# Patient Record
Sex: FEMALE | Race: WHITE | Employment: OTHER | URBAN - METROPOLITAN AREA
[De-identification: names, ages, dates, MRNs, and addresses within clinical notes are randomized per-mention and may not be internally consistent; named-entity substitution may affect disease eponyms.]

---

## 2019-08-27 ENCOUNTER — HOSPITAL ENCOUNTER (EMERGENCY)
Age: 39
Discharge: HOME | End: 2019-08-27
Attending: EMERGENCY MEDICINE
Payer: MEDICAID

## 2019-08-27 VITALS
HEIGHT: 66 IN | WEIGHT: 167.11 LBS | RESPIRATION RATE: 18 BRPM | TEMPERATURE: 98.9 F | BODY MASS INDEX: 26.86 KG/M2 | OXYGEN SATURATION: 97 % | DIASTOLIC BLOOD PRESSURE: 52 MMHG | SYSTOLIC BLOOD PRESSURE: 148 MMHG | HEART RATE: 72 BPM

## 2019-08-27 DIAGNOSIS — B86 SCABIES: Primary | ICD-10-CM

## 2019-08-27 PROCEDURE — 99282 EMERGENCY DEPT VISIT SF MDM: CPT

## 2019-08-27 RX ORDER — PERMETHRIN 50 MG/G
CREAM TOPICAL
Qty: 1 TUBE | Refills: 0 | Status: SHIPPED | OUTPATIENT
Start: 2019-08-27 | End: 2022-04-19

## 2019-08-27 RX ORDER — ALPRAZOLAM 1 MG/1
1 TABLET ORAL 3 TIMES DAILY
COMMUNITY
End: 2022-04-19

## 2019-08-27 RX ORDER — DEXTROAMPHETAMINE SACCHARATE, AMPHETAMINE ASPARTATE, DEXTROAMPHETAMINE SULFATE AND AMPHETAMINE SULFATE 5; 5; 5; 5 MG/1; MG/1; MG/1; MG/1
30 TABLET ORAL DAILY
COMMUNITY
End: 2022-04-19

## 2019-08-27 ASSESSMENT — PAIN DESCRIPTION - DESCRIPTORS: DESCRIPTORS: BURNING;ACHING

## 2019-08-27 ASSESSMENT — PAIN DESCRIPTION - LOCATION: LOCATION: ARM;LEG

## 2019-08-27 ASSESSMENT — PAIN DESCRIPTION - PROGRESSION: CLINICAL_PROGRESSION: GRADUALLY WORSENING

## 2019-08-27 ASSESSMENT — PAIN DESCRIPTION - PAIN TYPE: TYPE: ACUTE PAIN

## 2019-08-27 ASSESSMENT — PAIN DESCRIPTION - ONSET: ONSET: PROGRESSIVE

## 2019-08-27 ASSESSMENT — PAIN SCALES - GENERAL: PAINLEVEL_OUTOF10: 10

## 2019-08-27 ASSESSMENT — PAIN DESCRIPTION - ORIENTATION: ORIENTATION: RIGHT;LEFT

## 2019-08-27 NOTE — ED PROVIDER NOTES
Social History Narrative    Not on file       SCREENINGS           PHYSICAL EXAM    (up to 7 for level 4, 8 or more for level 5)     ED Triage Vitals [08/27/19 1159]   BP Temp Temp Source Pulse Resp SpO2 Height Weight   (!) 148/52 98.9 °F (37.2 °C) Oral 72 18 97 % 5' 6\" (1.676 m) 167 lb 1.7 oz (75.8 kg)       Physical Exam    General: Alert and awake ×3. Nontoxic appearance. Well-developed well-nourished chronically ill looking 22-year-old with a rash otherwise in no distress  HEENT: Normocephalic atraumatic. Neck is supple. Airway intact. No adenopathy  Cardiac: Regular rate and rhythm with no murmurs rubs or gallops  Pulmonary: Lungs are clear in all lung fields. No wheezing. No Rales. Abdomen: Soft and nontender. Negative hepatosplenomegaly. Bowel sounds are active  Extremities: Moving all extremities. No calf tenderness. Peripheral pulses all intact  Skin: Notable discrete lesions consistent with bites. There is no pearls noted. Areas of excoriation and bleeding from her picking at it. Do not see any evidence of lice at this time. Neurologic: Cranial nerves II through XII was grossly intact. Nonfocal neurological exam  Psychiatric: Patient is pleasant. Mood is appropriate. DIAGNOSTIC RESULTS     EKG (Per Emergency Physician):       RADIOLOGY (Per Emergency Physician): Interpretation per the Radiologist below, if available at the time of this note:  No results found. ED BEDSIDE ULTRASOUND:   Performed by ED Physician - none    LABS:  Labs Reviewed - No data to display     All other labs were within normal range or not returned as of this dictation. Procedures      EMERGENCY DEPARTMENT COURSE and DIFFERENTIAL DIAGNOSIS/MDM:   Vitals:    Vitals:    08/27/19 1159   BP: (!) 148/52   Pulse: 72   Resp: 18   Temp: 98.9 °F (37.2 °C)   TempSrc: Oral   SpO2: 97%   Weight: 167 lb 1.7 oz (75.8 kg)   Height: 5' 6\" (1.676 m)       Medications - No data to display    MDM.     This is a

## 2022-04-19 ENCOUNTER — HOSPITAL ENCOUNTER (EMERGENCY)
Age: 42
Discharge: ANOTHER HOSPITAL | End: 2022-04-19
Attending: EMERGENCY MEDICINE
Payer: MEDICAID

## 2022-04-19 ENCOUNTER — HOSPITAL ENCOUNTER (INPATIENT)
Age: 42
LOS: 6 days | Discharge: HOME | DRG: 885 | End: 2022-04-25
Attending: PSYCHIATRY & NEUROLOGY | Admitting: PSYCHIATRY & NEUROLOGY
Payer: MEDICAID

## 2022-04-19 VITALS
DIASTOLIC BLOOD PRESSURE: 69 MMHG | HEART RATE: 98 BPM | HEIGHT: 67 IN | SYSTOLIC BLOOD PRESSURE: 101 MMHG | OXYGEN SATURATION: 98 % | TEMPERATURE: 98.8 F | BODY MASS INDEX: 28.93 KG/M2 | WEIGHT: 184.3 LBS | RESPIRATION RATE: 17 BRPM

## 2022-04-19 DIAGNOSIS — Z59.00 HOMELESSNESS: ICD-10-CM

## 2022-04-19 DIAGNOSIS — R45.851 DEPRESSION WITH SUICIDAL IDEATION: Primary | ICD-10-CM

## 2022-04-19 DIAGNOSIS — F29 PSYCHOSIS, UNSPECIFIED PSYCHOSIS TYPE (HCC): Primary | ICD-10-CM

## 2022-04-19 DIAGNOSIS — F32.A DEPRESSION WITH SUICIDAL IDEATION: Primary | ICD-10-CM

## 2022-04-19 PROBLEM — F23 ACUTE PSYCHOSIS (HCC): Status: ACTIVE | Noted: 2022-04-19

## 2022-04-19 LAB
A/G RATIO: 1.4 (ref 1.1–2.2)
ACETAMINOPHEN LEVEL: <5 UG/ML (ref 10–30)
ALBUMIN SERPL-MCNC: 4.5 G/DL (ref 3.4–5)
ALP BLD-CCNC: 99 U/L (ref 40–129)
ALT SERPL-CCNC: 15 U/L (ref 10–40)
AMPHETAMINE SCREEN, URINE: ABNORMAL
ANION GAP SERPL CALCULATED.3IONS-SCNC: 18 MMOL/L (ref 3–16)
AST SERPL-CCNC: 14 U/L (ref 15–37)
BARBITURATE SCREEN URINE: ABNORMAL
BASOPHILS ABSOLUTE: 0 K/UL (ref 0–0.2)
BASOPHILS RELATIVE PERCENT: 0.3 %
BENZODIAZEPINE SCREEN, URINE: ABNORMAL
BILIRUB SERPL-MCNC: 0.4 MG/DL (ref 0–1)
BILIRUBIN URINE: NEGATIVE
BLOOD, URINE: NEGATIVE
BUN BLDV-MCNC: 12 MG/DL (ref 7–20)
CALCIUM SERPL-MCNC: 9.5 MG/DL (ref 8.3–10.6)
CANNABINOID SCREEN URINE: POSITIVE
CHLORIDE BLD-SCNC: 107 MMOL/L (ref 99–110)
CLARITY: CLEAR
CO2: 19 MMOL/L (ref 21–32)
COCAINE METABOLITE SCREEN URINE: POSITIVE
COLOR: YELLOW
CREAT SERPL-MCNC: 0.5 MG/DL (ref 0.6–1.1)
EKG ATRIAL RATE: 66 BPM
EKG DIAGNOSIS: NORMAL
EKG P AXIS: 43 DEGREES
EKG P-R INTERVAL: 132 MS
EKG Q-T INTERVAL: 422 MS
EKG QRS DURATION: 76 MS
EKG QTC CALCULATION (BAZETT): 442 MS
EKG R AXIS: 23 DEGREES
EKG T AXIS: 20 DEGREES
EKG VENTRICULAR RATE: 66 BPM
EOSINOPHILS ABSOLUTE: 0.1 K/UL (ref 0–0.6)
EOSINOPHILS RELATIVE PERCENT: 1.6 %
ETHANOL: NORMAL MG/DL (ref 0–0.08)
GFR AFRICAN AMERICAN: >60
GFR NON-AFRICAN AMERICAN: >60
GLUCOSE BLD-MCNC: 100 MG/DL (ref 70–99)
GLUCOSE URINE: NEGATIVE MG/DL
HCG(URINE) PREGNANCY TEST: NEGATIVE
HCT VFR BLD CALC: 44 % (ref 36–48)
HEMOGLOBIN: 14.5 G/DL (ref 12–16)
KETONES, URINE: NEGATIVE MG/DL
LEUKOCYTE ESTERASE, URINE: NEGATIVE
LYMPHOCYTES ABSOLUTE: 1.4 K/UL (ref 1–5.1)
LYMPHOCYTES RELATIVE PERCENT: 19.8 %
Lab: ABNORMAL
MCH RBC QN AUTO: 29.5 PG (ref 26–34)
MCHC RBC AUTO-ENTMCNC: 33 G/DL (ref 31–36)
MCV RBC AUTO: 89.2 FL (ref 80–100)
METHADONE SCREEN, URINE: ABNORMAL
MICROSCOPIC EXAMINATION: NORMAL
MONOCYTES ABSOLUTE: 0.4 K/UL (ref 0–1.3)
MONOCYTES RELATIVE PERCENT: 5.6 %
NEUTROPHILS ABSOLUTE: 5.1 K/UL (ref 1.7–7.7)
NEUTROPHILS RELATIVE PERCENT: 72.7 %
NITRITE, URINE: NEGATIVE
OPIATE SCREEN URINE: ABNORMAL
OXYCODONE URINE: ABNORMAL
PDW BLD-RTO: 13.8 % (ref 12.4–15.4)
PH UA: 6
PH UA: 6 (ref 5–8)
PHENCYCLIDINE SCREEN URINE: ABNORMAL
PLATELET # BLD: 250 K/UL (ref 135–450)
PMV BLD AUTO: 8.1 FL (ref 5–10.5)
POTASSIUM REFLEX MAGNESIUM: 3.9 MMOL/L (ref 3.5–5.1)
PROPOXYPHENE SCREEN: ABNORMAL
PROTEIN UA: NEGATIVE MG/DL
RBC # BLD: 4.93 M/UL (ref 4–5.2)
SALICYLATE, SERUM: <0.3 MG/DL (ref 15–30)
SARS-COV-2, NAAT: NOT DETECTED
SODIUM BLD-SCNC: 144 MMOL/L (ref 136–145)
SPECIFIC GRAVITY UA: 1.01 (ref 1–1.03)
TOTAL PROTEIN: 7.8 G/DL (ref 6.4–8.2)
URINE REFLEX TO CULTURE: NORMAL
URINE TYPE: NORMAL
UROBILINOGEN, URINE: 0.2 E.U./DL
WBC # BLD: 7.1 K/UL (ref 4–11)

## 2022-04-19 PROCEDURE — 93010 ELECTROCARDIOGRAM REPORT: CPT | Performed by: INTERNAL MEDICINE

## 2022-04-19 PROCEDURE — 80179 DRUG ASSAY SALICYLATE: CPT

## 2022-04-19 PROCEDURE — 80053 COMPREHEN METABOLIC PANEL: CPT

## 2022-04-19 PROCEDURE — 87635 SARS-COV-2 COVID-19 AMP PRB: CPT

## 2022-04-19 PROCEDURE — 84703 CHORIONIC GONADOTROPIN ASSAY: CPT

## 2022-04-19 PROCEDURE — 82077 ASSAY SPEC XCP UR&BREATH IA: CPT

## 2022-04-19 PROCEDURE — 85025 COMPLETE CBC W/AUTO DIFF WBC: CPT

## 2022-04-19 PROCEDURE — 80307 DRUG TEST PRSMV CHEM ANLYZR: CPT

## 2022-04-19 PROCEDURE — 36415 COLL VENOUS BLD VENIPUNCTURE: CPT

## 2022-04-19 PROCEDURE — 80143 DRUG ASSAY ACETAMINOPHEN: CPT

## 2022-04-19 PROCEDURE — 93005 ELECTROCARDIOGRAM TRACING: CPT | Performed by: EMERGENCY MEDICINE

## 2022-04-19 PROCEDURE — 99285 EMERGENCY DEPT VISIT HI MDM: CPT

## 2022-04-19 PROCEDURE — 81003 URINALYSIS AUTO W/O SCOPE: CPT

## 2022-04-19 PROCEDURE — 6370000000 HC RX 637 (ALT 250 FOR IP): Performed by: EMERGENCY MEDICINE

## 2022-04-19 PROCEDURE — 6370000000 HC RX 637 (ALT 250 FOR IP): Performed by: PSYCHIATRY & NEUROLOGY

## 2022-04-19 PROCEDURE — 1240000000 HC EMOTIONAL WELLNESS R&B

## 2022-04-19 RX ORDER — LORAZEPAM 2 MG/1
2 TABLET ORAL EVERY 6 HOURS PRN
Status: DISCONTINUED | OUTPATIENT
Start: 2022-04-19 | End: 2022-04-25 | Stop reason: HOSPADM

## 2022-04-19 RX ORDER — NAPROXEN 500 MG/1
500 TABLET ORAL 2 TIMES DAILY PRN
Status: ON HOLD | COMMUNITY
End: 2022-04-25 | Stop reason: HOSPADM

## 2022-04-19 RX ORDER — IBUPROFEN 600 MG/1
600 TABLET ORAL EVERY 6 HOURS PRN
Status: DISCONTINUED | OUTPATIENT
Start: 2022-04-19 | End: 2022-04-25 | Stop reason: HOSPADM

## 2022-04-19 RX ORDER — ALBUTEROL SULFATE 90 UG/1
2 AEROSOL, METERED RESPIRATORY (INHALATION) EVERY 4 HOURS PRN
COMMUNITY

## 2022-04-19 RX ORDER — 0.9 % SODIUM CHLORIDE 0.9 %
1000 INTRAVENOUS SOLUTION INTRAVENOUS ONCE
Status: DISCONTINUED | OUTPATIENT
Start: 2022-04-19 | End: 2022-04-19 | Stop reason: HOSPADM

## 2022-04-19 RX ORDER — BENZTROPINE MESYLATE 0.5 MG/1
0.5 TABLET ORAL 2 TIMES DAILY
Status: ON HOLD | COMMUNITY
End: 2022-04-25 | Stop reason: HOSPADM

## 2022-04-19 RX ORDER — HYDROXYZINE PAMOATE 25 MG/1
25 CAPSULE ORAL EVERY 6 HOURS PRN
Status: ON HOLD | COMMUNITY
End: 2022-04-25 | Stop reason: HOSPADM

## 2022-04-19 RX ORDER — LORAZEPAM 1 MG/1
1 TABLET ORAL ONCE
Status: COMPLETED | OUTPATIENT
Start: 2022-04-19 | End: 2022-04-19

## 2022-04-19 RX ORDER — DEXTROAMPHETAMINE SACCHARATE, AMPHETAMINE ASPARTATE, DEXTROAMPHETAMINE SULFATE AND AMPHETAMINE SULFATE 5; 5; 5; 5 MG/1; MG/1; MG/1; MG/1
20 TABLET ORAL DAILY
Status: ON HOLD | COMMUNITY
End: 2022-04-25 | Stop reason: HOSPADM

## 2022-04-19 RX ADMIN — LORAZEPAM 1 MG: 1 TABLET ORAL at 13:02

## 2022-04-19 RX ADMIN — IBUPROFEN 600 MG: 600 TABLET ORAL at 21:21

## 2022-04-19 RX ADMIN — LORAZEPAM 2 MG: 2 TABLET ORAL at 21:21

## 2022-04-19 SDOH — ECONOMIC STABILITY: FOOD INSECURITY: WITHIN THE PAST 12 MONTHS, THE FOOD YOU BOUGHT JUST DIDN'T LAST AND YOU DIDN'T HAVE MONEY TO GET MORE.: SOMETIMES TRUE

## 2022-04-19 SDOH — ECONOMIC STABILITY: HOUSING INSECURITY
IN THE LAST 12 MONTHS, WAS THERE A TIME WHEN YOU DID NOT HAVE A STEADY PLACE TO SLEEP OR SLEPT IN A SHELTER (INCLUDING NOW)?: YES

## 2022-04-19 SDOH — ECONOMIC STABILITY: HOUSING INSECURITY

## 2022-04-19 SDOH — SOCIAL STABILITY: SOCIAL INSECURITY
WITHIN THE LAST YEAR, HAVE YOU BEEN HUMILIATED OR EMOTIONALLY ABUSED IN OTHER WAYS BY YOUR PARTNER OR EX-PARTNER?: PATIENT DECLINED

## 2022-04-19 SDOH — ECONOMIC STABILITY - HOUSING INSECURITY: HOMELESSNESS UNSPECIFIED: Z59.00

## 2022-04-19 SDOH — ECONOMIC STABILITY: INCOME INSECURITY: IN THE LAST 12 MONTHS, WAS THERE A TIME WHEN YOU WERE NOT ABLE TO PAY THE MORTGAGE OR RENT ON TIME?: YES

## 2022-04-19 SDOH — SOCIAL STABILITY: SOCIAL INSECURITY: WITHIN THE LAST YEAR, HAVE YOU BEEN AFRAID OF YOUR PARTNER OR EX-PARTNER?: NO

## 2022-04-19 SDOH — SOCIAL STABILITY: SOCIAL NETWORK: HOW OFTEN DO YOU ATTEND CHURCH OR RELIGIOUS SERVICES?: NEVER

## 2022-04-19 SDOH — ECONOMIC STABILITY: INCOME INSECURITY: HOW HARD IS IT FOR YOU TO PAY FOR THE VERY BASICS LIKE FOOD, HOUSING, MEDICAL CARE, AND HEATING?: NOT HARD AT ALL

## 2022-04-19 SDOH — HEALTH STABILITY: PHYSICAL HEALTH: ON AVERAGE, HOW MANY DAYS PER WEEK DO YOU ENGAGE IN MODERATE TO STRENUOUS EXERCISE (LIKE A BRISK WALK)?: 7 DAYS

## 2022-04-19 SDOH — SOCIAL STABILITY: SOCIAL NETWORK

## 2022-04-19 SDOH — HEALTH STABILITY: PHYSICAL HEALTH: ON AVERAGE, HOW MANY MINUTES DO YOU ENGAGE IN EXERCISE AT THIS LEVEL?: 50 MIN

## 2022-04-19 SDOH — HEALTH STABILITY: MENTAL HEALTH: HOW MANY STANDARD DRINKS CONTAINING ALCOHOL DO YOU HAVE ON A TYPICAL DAY?: PATIENT DECLINED

## 2022-04-19 SDOH — ECONOMIC STABILITY: FOOD INSECURITY: WITHIN THE PAST 12 MONTHS, YOU WORRIED THAT YOUR FOOD WOULD RUN OUT BEFORE YOU GOT MONEY TO BUY MORE.: SOMETIMES TRUE

## 2022-04-19 SDOH — SOCIAL STABILITY: SOCIAL NETWORK
DO YOU BELONG TO ANY CLUBS OR ORGANIZATIONS SUCH AS CHURCH GROUPS UNIONS, FRATERNAL OR ATHLETIC GROUPS, OR SCHOOL GROUPS?: NO

## 2022-04-19 SDOH — ECONOMIC STABILITY: TRANSPORTATION INSECURITY
IN THE PAST 12 MONTHS, HAS LACK OF TRANSPORTATION KEPT YOU FROM MEETINGS, WORK, OR FROM GETTING THINGS NEEDED FOR DAILY LIVING?: YES

## 2022-04-19 SDOH — ECONOMIC STABILITY: TRANSPORTATION INSECURITY
IN THE PAST 12 MONTHS, HAS THE LACK OF TRANSPORTATION KEPT YOU FROM MEDICAL APPOINTMENTS OR FROM GETTING MEDICATIONS?: YES

## 2022-04-19 SDOH — SOCIAL STABILITY: SOCIAL NETWORK: IN A TYPICAL WEEK, HOW MANY TIMES DO YOU TALK ON THE PHONE WITH FAMILY, FRIENDS, OR NEIGHBORS?: PATIENT DECLINED

## 2022-04-19 SDOH — SOCIAL STABILITY: SOCIAL INSECURITY
WITHIN THE LAST YEAR, HAVE YOU BEEN KICKED, HIT, SLAPPED, OR OTHERWISE PHYSICALLY HURT BY YOUR PARTNER OR EX-PARTNER?: PATIENT DECLINED

## 2022-04-19 SDOH — SOCIAL STABILITY: SOCIAL INSECURITY
WITHIN THE LAST YEAR, HAVE TO BEEN RAPED OR FORCED TO HAVE ANY KIND OF SEXUAL ACTIVITY BY YOUR PARTNER OR EX-PARTNER?: PATIENT DECLINED

## 2022-04-19 SDOH — HEALTH STABILITY: MENTAL HEALTH: HOW OFTEN DO YOU HAVE A DRINK CONTAINING ALCOHOL?: PATIENT DECLINED

## 2022-04-19 SDOH — HEALTH STABILITY: MENTAL HEALTH
STRESS IS WHEN SOMEONE FEELS TENSE, NERVOUS, ANXIOUS, OR CAN'T SLEEP AT NIGHT BECAUSE THEIR MIND IS TROUBLED. HOW STRESSED ARE YOU?: VERY MUCH

## 2022-04-19 SDOH — SOCIAL STABILITY: SOCIAL NETWORK: HOW OFTEN DO YOU ATTENT MEETINGS OF THE CLUB OR ORGANIZATION YOU BELONG TO?: NEVER

## 2022-04-19 SDOH — SOCIAL STABILITY: SOCIAL NETWORK: HOW OFTEN DO YOU GET TOGETHER WITH FRIENDS OR RELATIVES?: PATIENT DECLINED

## 2022-04-19 ASSESSMENT — PAIN SCALES - GENERAL
PAINLEVEL_OUTOF10: 8
PAINLEVEL_OUTOF10: 7

## 2022-04-19 ASSESSMENT — SLEEP AND FATIGUE QUESTIONNAIRES
DIFFICULTY STAYING ASLEEP: YES
RESTFUL SLEEP: YES
DIFFICULTY FALLING ASLEEP: YES
SLEEP PATTERN: DIFFICULTY FALLING ASLEEP;EARLY AWAKENING;RESTLESSNESS;INSOMNIA;DISTURBED/INTERRUPTED SLEEP
DIFFICULTY ARISING: NO
DO YOU USE A SLEEP AID: NO
AVERAGE NUMBER OF SLEEP HOURS: 6
DO YOU HAVE DIFFICULTY SLEEPING: YES

## 2022-04-19 ASSESSMENT — LIFESTYLE VARIABLES: HISTORY_ALCOHOL_USE: YES

## 2022-04-19 ASSESSMENT — PAIN DESCRIPTION - PAIN TYPE: TYPE: ACUTE PAIN

## 2022-04-19 ASSESSMENT — PAIN DESCRIPTION - LOCATION: LOCATION: MOUTH;NOSE

## 2022-04-19 ASSESSMENT — PATIENT HEALTH QUESTIONNAIRE - PHQ9: SUM OF ALL RESPONSES TO PHQ QUESTIONS 1-9: 9

## 2022-04-19 NOTE — BH NOTE
Patient arrived on unit at approximately 1910 accompanied by [facility]. She was calm and cooperative on initials assessment. Vitals were taken, ADL items provided, food and drink was requested and provided. Pt then stated she was going to take a shower and change. Will continue to monitor.

## 2022-04-19 NOTE — PROGRESS NOTES
Medication Reconciliation    List of medications patient is currently taking is IN PROGRESS    Unable to assess patient's home medication history at this time due to agitation / homicidal ideation    Updated medication history based on [facility] Records / INOCENTE [facility], PharmD, BCPS  4/19/2022 11:41 AM

## 2022-04-19 NOTE — ED NOTES
Patient refused to allow me to insert PIV.       Goznalo Diaz RN  04/19/22 9812 Recommend smaller more frequent meals and avoid late-night eating ( within 2-3 hours of bed)  Consider elevating the head of your bed at night with blocks or a mattress wedge  Continue to minimize fatty/fried foods, chocolate, caffeine, alcohol, NSAIDs ( ibuprofen /Motrin / Advil, Aleve /naproxen, full-dose aspirin)   try to decrease your smoking as you are able   gentle weight loss as you are able   continue to take the pantoprazole in the morning 20-30 minutes before you eat or drink  You will be scheduled for upper scope here at our endoscopy center    Gastroesophageal Reflux Disease   WHAT YOU NEED TO KNOW:   What is gastroesophageal reflux disease? Gastroesophageal reflux occurs when acid and food in the stomach back up into the esophagus  Gastroesophageal reflux disease (GERD) is reflux that occurs more than twice a week for a few weeks  It usually causes heartburn and other symptoms  GERD can cause other health problems over time if it is not treated  What causes GERD? GERD often occurs when the lower muscle (sphincter) of the esophagus does not close properly  The sphincter normally opens to let food into the stomach  It then closes to keep food and stomach acid in the stomach  If the sphincter does not close properly, stomach acid and food back up (reflux) into the esophagus  The following may increase your risk for GERD:  · Certain foods such as spicy foods, chocolate, foods that contain caffeine, peppermint, and fried foods    · Hiatal hernia    · Certain medicines such as calcium channel blockers (used to treat high blood pressure), allergy medicines, sedatives, or antidepressants    · Pregnancy or obesity    · Lying down after a meal    · Drinking alcohol or smoking  What are the signs and symptoms of GERD? Heartburn is the most common symptom of GERD  You may feel burning pain in your chest or below the breast bone  This usually occurs after meals and spreads to your neck, jaw, or shoulder  The pain gets better when you change positions  You may also have any of the following:  · Bitter or acid taste in your mouth    · Dry cough    · Trouble swallowing or pain with swallowing    · Hoarseness or sore throat    · Frequent burping or hiccups    · Feeling of fullness soon after you start eating  How is GERD diagnosed? Your healthcare provider will ask about your symptoms and when they started  Tell him or her about other medical conditions you have, your eating habits, and your activities  You may also need any of the following:  · Esophageal pH monitoring  is used to place a small probe inside your esophagus and stomach to check the amount of acid  · An endoscopy  is a procedure used to look at the inside of your esophagus and stomach  An endoscope is a bendable tube with a light and camera on the end  Your healthcare provider may remove a small sample of tissue and send it to a lab for tests  · Upper GI x-rays  are done to take pictures of your stomach and intestines (bowel)  You may be given a chalky liquid to drink before the pictures are taken  This liquid helps your stomach and intestines show up better on the x-rays  · Esophageal manometry  is a test that shows how your esophagus pushes food and fluid to your stomach  It also shows the pressures in your esophagus and stomach  It may show a hiatal hernia  How is GERD treated? · Medicines  are used to decrease stomach acid  Medicine may also be used to help your lower esophageal sphincter and stomach contract (tighten) more  · Surgery  is done to wrap the upper part of the stomach around the esophageal sphincter  This will strengthen the sphincter and prevent reflux  How can I help manage GERD? · Do not have foods or drinks that may increase heartburn  These include chocolate, peppermint, fried or fatty foods, drinks that contain caffeine, or carbonated drinks (soda)   Other foods include spicy foods, onions, tomatoes, and tomato-based foods  Do not have foods or drinks that can irritate your esophagus, such as citrus fruits, juices, and alcohol  · Do not eat large meals  When you eat a lot of food at one time, your stomach needs more acid to digest it  Eat 6 small meals each day instead of 3 large ones, and eat slowly  Do not eat meals 2 to 3 hours before bedtime  · Elevate the head of your bed  Place 6-inch blocks under the head of your bed frame  You may also use more than one pillow under your head and shoulders while you sleep  · Maintain a healthy weight  If you are overweight, weight loss may help relieve symptoms of GERD  · Do not smoke  Smoking weakens the lower esophageal sphincter and increases the risk of GERD  Ask your healthcare provider for information if you currently smoke and need help to quit  E-cigarettes or smokeless tobacco still contain nicotine  Talk to your healthcare provider before you use these products  · Do not wear clothing that is tight around your waist   Tight clothing can put pressure on your stomach and cause or worsen GERD symptoms  When should I seek immediate care? · You feel full and cannot burp or vomit  · You have severe chest pain and sudden trouble breathing  · Your bowel movements are black, bloody, or tarry-looking  · Your vomit looks like coffee grounds or has blood in it  When should I contact my healthcare provider? · You vomit large amounts, or you vomit often  · You have trouble breathing after you vomit  · You have trouble swallowing, or pain with swallowing  · You are losing weight without trying  · Your symptoms get worse or do not improve with treatment  · You have questions or concerns about your condition or care  CARE AGREEMENT:   You have the right to help plan your care  Learn about your health condition and how it may be treated  Discuss treatment options with your caregivers to decide what care you want to receive   You always have the right to refuse treatment  The above information is an  only  It is not intended as medical advice for individual conditions or treatments  Talk to your doctor, nurse or pharmacist before following any medical regimen to see if it is safe and effective for you  © 2017 [employer] Information is for End User's use only and may not be sold, redistributed or otherwise used for commercial purposes  All illustrations and images included in CareNotes® are the copyrighted property of A D A M , Inc  or [employer] Rene

## 2022-04-19 NOTE — ED PROVIDER NOTES
[address] [facility]      Pt Name: Andrew Giraldo  MRN: [id number]  [place] 1980  Date of evaluation: 4/19/2022  Provider: Arthur Humphrey DO    CHIEF COMPLAINT       Chief Complaint   Patient presents with    Suicidal     states that she is tired of their shit she doesnt want to live any more she has been off her medications x 2 weeks     Homicidal     Patient reports suicidal ideations and states her plan is to \"blow up the world\", patient's speech is irratic, flight of ideas and irrational thought process noted, patient rambling off various dates and referencing the \"end of the world\". Patient also expresses thoughts of harming others and references bombs and \"end of days\"         HISTORY OF PRESENT ILLNESS   (Location/Symptom, Timing/Onset, Context/Setting, Quality, Duration, Modifying Factors, Severity)  Note limiting factors. Andrew Giraldo is a 39 y.o. female who presents to the emergency department with a complaint of suicidal and homicidal ideations. The patient states that she took a bus to the hospital today because she felt like she needed help. She is currently homeless. She states that her family and other families are \"conspiring against her\". She states that people want to beat her up and make her into a prostitute. She denies any physical injury or harm. She denies any sexual assault. She states that she occasionally hears voices but does not hear voices today. The voices do not direct her to do anything. She denies any visual hallucinations. She states \"I am tired of everyone and I am tired of living\". She reports a history of prior suicide attempts with taking a fentanyl overdose. She reports suicidal thoughts and states that if she were to kill her self it would likely be because of taking fentanyl. She denies any current drug use. She denies any alcohol use.   She denies ingestion of any medications chemicals or substances recently or today. She smokes tobacco.  She occasionally smokes marijuana. She also reports that she has had homicidal ideations. She states \"I just want to blow everyone up and burn it down\". She denies any specific plan to harm anyone. Denies any recent illness, fever chills cough cold symptoms. She denies any headache chest pain shortness of breath. She denies any abdominal pain nausea vomiting or diarrhea. She is not pregnant. She denies any back or flank pain. No focal weakness or numbness. Nursing Notes were reviewed. HPI        REVIEW OF SYSTEMS    (2-9 systems for level 4, 10 or more for level 5)       Constitutional: Negative for fever or chills. HENT: Negative for rhinorrhea and sore throat. Eyes: Negative for redness or drainage. Respiratory: Negative for shortness of breath or dyspnea on exertion. Cardiovascular: Negative for chest pain. Gastrointestinal: Negative for abdominal pain. Negative for vomiting or diarrhea. Genitourinary: Negative for flank pain. Negative for dysuria. Negative for hematuria. Neurological: Negative for headache. Musculoskeletal:  Negative edema. Hematological: Negative for adenopathy. All systems are reviewed and are negative except for those listed above in the history of present illness and ROS. PAST MEDICAL HISTORY     Past Medical History:   Diagnosis Date    Anxiety     Cancer ([facility] [address]     breast    Depression     Heart murmur     Hypertension          SURGICAL HISTORY       Past Surgical History:   Procedure Laterality Date    BREAST SURGERY           CURRENT MEDICATIONS       Previous Medications    ALBUTEROL SULFATE HFA (VENTOLIN HFA) 108 (90 BASE) MCG/ACT INHALER    Inhale 2 puffs into the lungs every 4 hours as needed for Wheezing or Shortness of Breath    AMPHETAMINE-DEXTROAMPHETAMINE (ADDERALL) 20 MG TABLET    Take 20 mg by mouth daily.     BENZTROPINE (COGENTIN) 0.5 MG TABLET    Take 0.5 mg by mouth 2 times daily    HYDROXYZINE (VISTARIL) 25 MG CAPSULE    Take 25 mg by mouth every 6 hours as needed for Itching    NAPROXEN (NAPROSYN) 500 MG TABLET    Take 500 mg by mouth 2 times daily as needed for Pain       ALLERGIES     Codeine, Tramadol, Vicodin [hydrocodone-acetaminophen], and Geodon [ziprasidone hydrochloride]    FAMILY HISTORY     History reviewed. No pertinent family history. SOCIAL HISTORY       Social History     Socioeconomic History    Marital status: Single     Spouse name: None    Number of children: None    Years of education: None    Highest education level: None   Occupational History    None   Tobacco Use    Smoking status: Current Every Day Smoker     Packs/day: 1.00     Types: Cigarettes    Smokeless tobacco: None   Substance and Sexual Activity    Alcohol use: No     Comment: occassionally    Drug use: Yes     Types: Marijuana Augusto Pare)    Sexual activity: Yes   Other Topics Concern    None   Social History Narrative    None     Social Determinants of Health     Financial Resource Strain:     Difficulty of Paying Living Expenses: Not on file   Food Insecurity:     Worried About Running Out of Food in the Last Year: Not on file    Shan of Food in the Last Year: Not on file   Transportation Needs:     Lack of Transportation (Medical): Not on file    Lack of Transportation (Non-Medical):  Not on file   Physical Activity:     Days of Exercise per Week: Not on file    Minutes of Exercise per Session: Not on file   Stress:     Feeling of Stress : Not on file   Social Connections:     Frequency of Communication with Friends and Family: Not on file    Frequency of Social Gatherings with Friends and Family: Not on file    Attends Congregational Services: Not on file    Active Member of Clubs or Organizations: Not on file    Attends Club or Organization Meetings: Not on file    Marital Status: Not on file   Intimate Partner Violence:     Fear of Current or Ex-Partner: Not on file    Emotionally Abused: Not on file    Physically Abused: Not on file    Sexually Abused: Not on file   Housing Stability:     Unable to Pay for Housing in the Last Year: Not on file    Number of [place] in the Last Year: Not on file    Unstable Housing in the Last Year: Not on file       SCREENINGS    Katie Coma Scale  Eye Opening: Spontaneous  Best Verbal Response: Oriented  Best Motor Response: Obeys commands  Randolph Coma Scale Score: 15        PHYSICAL EXAM    (up to 7 for level 4, 8 or more for level 5)     ED Triage Vitals [04/19/22 0943]   BP Temp Temp Source Pulse Resp SpO2 Height Weight   121/84 97.8 °F (36.6 °C) Oral 73 17 99 % 5' 7\" (1.702 m) 184 lb 4.9 oz (83.6 kg)         Physical Exam   Constitutional: Awake and alert. Cooperative. Head: No visible evidence of trauma. Normocephalic. Eyes: Pupils equal and reactive. No photophobia. Conjunctiva normal.    HENT: Oral mucosa moist.  Airway patent. Pharynx without erythema. Nares were clear. Neck:  Soft and supple. Nontender. Heart:  Regular rate and rhythm. No murmur. Lungs:  Clear to auscultation. No wheezes, rales, or ronchi. No conversational dyspnea or accessory muscle use. Chest: Chest wall non-tender. No evidence of trauma. Abdomen:  Soft, nondistended, bowel sounds present. Nontender. No guarding rigidity or rebound. No masses. Musculoskeletal: Extremities non-tender with full range of motion. Radial and dorsalis pedis pulses were intact. No calf tenderness erythema or edema. Neurological: Alert and oriented x 3. Speech clear. No dysarthria. No aphasia. Cranial nerves II-XII intact. No facial droop. No acute focal motor or sensory deficits. Skin: Skin is warm and dry. No rash. Lymphatic:  No lympadenopathy. Psychiatric: Very soft-spoken speech. Poor eye contact. Speech is mumbled to the point that I had difficulty understanding the patient.   She did become easily agitated with conversation. No obvious hallucinations. Judgment and mentation appear to be intact. Conversation was somewhat tangential.  Initially during triage, the nurse reported that the patient was fixated on the end of the world and talking about the end of days and bombs. DIAGNOSTIC RESULTS     EKG: All EKG's are interpreted by the Emergency Department Physician who either signs or Co-signs this chart in the absence of a cardiologist.    Normal sinus rhythm. Rate 66. DC interval 132 ms. QRS duration 76 ms. QTc 442 ms. R Axis XX 3 degrees. No ST elevation. Normal EKG.     RADIOLOGY:   Non-plain film images such as CT, Ultrasound and MRI are read by the radiologist. Plain radiographic images are visualized and preliminarily interpreted by the emergency physician with the below findings:        Interpretation per the Radiologist below, if available at the time of this note:    No orders to display         ED BEDSIDE ULTRASOUND:   Performed by ED Physician - none    LABS:  Labs Reviewed   COMPREHENSIVE METABOLIC PANEL W/ REFLEX TO MG FOR LOW K - Abnormal; Notable for the following components:       Result Value    CO2 19 (*)     Anion Gap 18 (*)     Glucose 100 (*)     CREATININE 0.5 (*)     AST 14 (*)     All other components within normal limits   URINE DRUG SCREEN - Abnormal; Notable for the following components:    Cannabinoid Scrn, Ur POSITIVE (*)     Cocaine Metabolite Screen, Urine POSITIVE (*)     All other components within normal limits   ACETAMINOPHEN LEVEL - Abnormal; Notable for the following components:    Acetaminophen Level <5 (*)     All other components within normal limits   SALICYLATE LEVEL - Abnormal; Notable for the following components:    Salicylate, Serum <6.0 (*)     All other components within normal limits   COVID-19, RAPID   CBC WITH AUTO DIFFERENTIAL   URINALYSIS WITH REFLEX TO CULTURE   PREGNANCY, URINE   ETHANOL       All other labs were within normal range or not returned as of this dictation. EMERGENCY DEPARTMENT COURSE and DIFFERENTIAL DIAGNOSIS/MDM:   Vitals:    Vitals:    04/19/22 0943 04/19/22 1137   BP: 121/84 102/70   Pulse: 73 58   Resp: 17 16   Temp: 97.8 °F (36.6 °C)    TempSrc: Oral    SpO2: 99% 99%   Weight: 184 lb 4.9 oz (83.6 kg)    Height: 5' 7\" (1.702 m)          MDM      The patient presents with both suicidal and homicidal ideations as noted above. There also is some paranoid behavior. She is fearful that people are conspiring against her. She does appear to have some fixation on end of days and end of the world. No witnessed hallucinations during my examination. The patient does represent a danger to herself and potentially others. As result of this she was placed on a 72-hour hold. Paperwork was completed. REASSESSMENT          11:43 AM: Urinalysis is negative. Pregnancy test is negative. White blood cell count is normal.  Hemoglobin 14.5. Serum alcohol is 0. Acetaminophen salicylates are negative. Vijay Angers is 100. Bicarb is 19. Anion gap is 18 which were borderline. The patient denies any nausea vomiting or diarrhea. Patient is homeless and this may be secondary to inadequate fluid intake. She was given normal saline 1 L bolus. The patient is medically cleared. She will be admitted for further treatment and evaluation of her depression and suicidal ideation. Consult has been placed to psychiatry for placement in a psychiatric facility. COVID is negative. Drug screen is positive for THC and cocaine. 1:50 PM: The patient was accepted by Dr. Shani Pham at [facility]. Patient will be transferred by [facility] ambulance. We are currently awaiting bed availability. CRITICAL CARE TIME   Total Critical Care time was 30 minutes, excluding separately reportable procedures.   There was a high probability of clinically significant/life threatening deterioration in the patient's condition which required my urgent intervention. CONSULTS:  IP CONSULT TO PSYCHIATRY    PROCEDURES:  Unless otherwise noted below, none     Procedures        FINAL IMPRESSION      1. Depression with suicidal ideation    2. Homelessness          DISPOSITION/PLAN   DISPOSITION        PATIENT REFERRED TO:  No follow-up provider specified. DISCHARGE MEDICATIONS:  New Prescriptions    No medications on file     Controlled Substances Monitoring:     No flowsheet data found. (Please note that portions of this note were completed with a voice recognition program.  Efforts were made to edit the dictations but occasionally words are mis-transcribed. )    April Garcia DO (electronically signed)  Attending Emergency Physician          Daphne Newell DO  04/19/22 9329

## 2022-04-19 NOTE — ED NOTES
Explained to patient that she needed a PIV for IV fluids, patient refused to allow me to attempt to insert PIV.      Yulissa Castaneda, RN  04/19/22 0642

## 2022-04-19 NOTE — ED NOTES
[facility] here at this time to transfer patient to [facility].      Anthony Welch RN  04/19/22 6887

## 2022-04-19 NOTE — ED NOTES
Pt states that she would like to harm people she would not state who she states she will never tell us, she reports she is sick of their shit she would rather die, pt than goes on t talk about [place] and [place] , states its too late.  She does reports that she is tired off her brother and sister in law and their smoking,   She states she has been off her Risperdal for 2 weeks because it was not working, she states she has an appointment on the 20 th      Burnis Matas, [place]  04/19/22 7892

## 2022-04-19 NOTE — ED NOTES
Pt has been received in bed 31. All of her belongings have been taken and will be place in locker #1. Two sweatshirts, nate shirt, sweat pants, underwear, pocket contents, ponytail torres, shoes and socks, cell phone. Metal was removed from patients mask.       Nadeen Muñoz  04/19/22 1002

## 2022-04-19 NOTE — ED TRIAGE NOTES
Patient reports suicidal ideations and states her plan is to \"blow up the world\", patient's speech is irratic, flight of ideas and irrational thought process noted, patient rambling off various dates and referencing the \"end of the world\". Patient and room free from any dangerous items, patient placed in paper scrubs and all belongings placed in locker, no sxs of distress noted, one on one sitter initiated. Patient also expresses thoughts of harmong others and references bombs and \"end of days\".

## 2022-04-20 PROBLEM — Z59.00 HOMELESSNESS: Status: ACTIVE | Noted: 2022-04-20

## 2022-04-20 PROBLEM — F15.20 AMPHETAMINE USE DISORDER, SEVERE, IN CONTROLLED ENVIRONMENT (HCC): Status: ACTIVE | Noted: 2022-04-20

## 2022-04-20 PROBLEM — Z00.00 ENCOUNTER FOR ROUTINE ADULT MEDICAL EXAMINATION: Status: ACTIVE | Noted: 2022-04-20

## 2022-04-20 PROBLEM — F11.20 OPIOID USE DISORDER, SEVERE, IN CONTROLLED ENVIRONMENT (HCC): Status: ACTIVE | Noted: 2022-04-20

## 2022-04-20 PROBLEM — F12.20 CANNABIS USE DISORDER, SEVERE, IN CONTROLLED ENVIRONMENT (HCC): Status: ACTIVE | Noted: 2022-04-20

## 2022-04-20 PROBLEM — F14.20 COCAINE USE DISORDER, SEVERE, IN CONTROLLED ENVIRONMENT (HCC): Status: ACTIVE | Noted: 2022-04-20

## 2022-04-20 PROBLEM — Z72.0 TOBACCO USE: Status: ACTIVE | Noted: 2022-04-20

## 2022-04-20 PROCEDURE — 6370000000 HC RX 637 (ALT 250 FOR IP): Performed by: PSYCHIATRY & NEUROLOGY

## 2022-04-20 PROCEDURE — 1240000000 HC EMOTIONAL WELLNESS R&B

## 2022-04-20 PROCEDURE — 99223 1ST HOSP IP/OBS HIGH 75: CPT | Performed by: PSYCHIATRY & NEUROLOGY

## 2022-04-20 PROCEDURE — 99221 1ST HOSP IP/OBS SF/LOW 40: CPT | Performed by: NURSE PRACTITIONER

## 2022-04-20 RX ORDER — PALIPERIDONE 3 MG/1
6 TABLET, EXTENDED RELEASE ORAL DAILY
Status: DISCONTINUED | OUTPATIENT
Start: 2022-04-21 | End: 2022-04-25 | Stop reason: HOSPADM

## 2022-04-20 RX ORDER — POLYETHYLENE GLYCOL 3350 17 G
2 POWDER IN PACKET (EA) ORAL
Status: DISCONTINUED | OUTPATIENT
Start: 2022-04-20 | End: 2022-04-25 | Stop reason: HOSPADM

## 2022-04-20 RX ORDER — HYDROXYZINE 50 MG/1
50 TABLET, FILM COATED ORAL EVERY 6 HOURS PRN
Status: DISCONTINUED | OUTPATIENT
Start: 2022-04-20 | End: 2022-04-25 | Stop reason: HOSPADM

## 2022-04-20 RX ORDER — OLANZAPINE 10 MG/1
10 TABLET, ORALLY DISINTEGRATING ORAL EVERY 8 HOURS PRN
Status: DISCONTINUED | OUTPATIENT
Start: 2022-04-20 | End: 2022-04-25 | Stop reason: HOSPADM

## 2022-04-20 RX ORDER — PALIPERIDONE 3 MG/1
3 TABLET, EXTENDED RELEASE ORAL DAILY
Status: COMPLETED | OUTPATIENT
Start: 2022-04-20 | End: 2022-04-20

## 2022-04-20 RX ADMIN — HYDROXYZINE HYDROCHLORIDE 50 MG: 50 TABLET, FILM COATED ORAL at 22:41

## 2022-04-20 RX ADMIN — NICOTINE POLACRILEX 2 MG: 2 LOZENGE ORAL at 19:49

## 2022-04-20 RX ADMIN — LORAZEPAM 2 MG: 2 TABLET ORAL at 21:04

## 2022-04-20 RX ADMIN — IBUPROFEN 600 MG: 600 TABLET ORAL at 21:06

## 2022-04-20 RX ADMIN — PALIPERIDONE 3 MG: 3 TABLET, EXTENDED RELEASE ORAL at 13:12

## 2022-04-20 ASSESSMENT — SLEEP AND FATIGUE QUESTIONNAIRES
DO YOU USE A SLEEP AID: NO
DIFFICULTY FALLING ASLEEP: YES
DIFFICULTY STAYING ASLEEP: YES
SLEEP PATTERN: DIFFICULTY FALLING ASLEEP;EARLY AWAKENING;RESTLESSNESS;INSOMNIA;DISTURBED/INTERRUPTED SLEEP
DO YOU HAVE DIFFICULTY SLEEPING: YES
AVERAGE NUMBER OF SLEEP HOURS: 6
RESTFUL SLEEP: YES
DIFFICULTY ARISING: NO

## 2022-04-20 ASSESSMENT — PATIENT HEALTH QUESTIONNAIRE - PHQ9: SUM OF ALL RESPONSES TO PHQ QUESTIONS 1-9: 9

## 2022-04-20 ASSESSMENT — PAIN DESCRIPTION - LOCATION: LOCATION: GENERALIZED

## 2022-04-20 ASSESSMENT — LIFESTYLE VARIABLES: HISTORY_ALCOHOL_USE: YES

## 2022-04-20 ASSESSMENT — PAIN SCALES - GENERAL: PAINLEVEL_OUTOF10: 10
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
## 2022-04-20 NOTE — PLAN OF CARE
Pt alert visible for short times, out for meals watches tv, pacing halls, then returns to bed. Pt denies SI, AVH, but states she hopes \"the twins die, if they don't Im [place] them myself\". Pt in bed at this time resting, OU closed respiration easy and even.

## 2022-04-20 NOTE — BH NOTE
Noted pt yelling cursing on the phone states \"the only way this shit is going to stop is by burning they're house down with them in it\". Pt asked to get off the phone which she did with little encouragement.

## 2022-04-20 NOTE — BH NOTE
Deepali Caraballo was admitted and oriented to the unit. She currently denies SI/HI and contracts for safety while here. Reports she is homeless and doesn't know where she will go after discharge. Encouraged her to speak with  about resources post-discharge. She has a flat affect and is a bit evasive with questioning. Denies alcohol use and denied drug use except for THC, even though Tox was positive for cocaine. MD cancelled suicide precautions. Snacks and Juice provided. She states she does not take any home medications currently. Home pharmacy added. She requested PRN Ibuprofen and Ativan, which was effective. She has been isolative to her room this evening and did not attend PM group. Will continue to monitor.
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
## 2022-04-20 NOTE — H&P
Hospital Medicine History & Physical      PCP: Referring Not In System (Inactive)    Date of Admission: 4/19/2022    Date of Service: Pt seen/examined on 04/20/22     Chief Complaint:  SI      History Of Present Illness: The patient is a 39 y.o. female with PMH of  Anxiety, depression, heart murmur and HTN who presented to [facility] for homicidal and suicidal thoughts. Patient was seen and evaluated in the ED by the ED medical provider, patient was medically cleared for admission to [facility]. This note serves as an admission medical H&P. Tobacco use: yes- up to 1ppd  ETOH use: occ  Illicit drug use: pt endorses marijuana use. UDS +THC and Cocaine     Patient denies any medical complaints     Past Medical History:        Diagnosis Date    Anxiety     Cancer ([address]     breast    Depression     Heart murmur     Hypertension        Past Surgical History:        Procedure Laterality Date    BREAST SURGERY      PARTIAL HYSTERECTOMY         Medications Prior to Admission:    Prior to Admission medications    Medication Sig Start Date End Date Taking? Authorizing Provider   amphetamine-dextroamphetamine (ADDERALL) 20 MG tablet Take 20 mg by mouth daily.     Historical Provider, MD   benztropine (COGENTIN) 0.5 MG tablet Take 0.5 mg by mouth 2 times daily    Historical Provider, MD   hydrOXYzine (VISTARIL) 25 MG capsule Take 25 mg by mouth every 6 hours as needed for Itching    Historical Provider, MD   naproxen (NAPROSYN) 500 MG tablet Take 500 mg by mouth 2 times daily as needed for Pain    Historical Provider, MD   albuterol sulfate HFA (VENTOLIN HFA) 108 (90 Base) MCG/ACT inhaler Inhale 2 puffs into the lungs every 4 hours as needed for Wheezing or Shortness of Breath    Historical Provider, MD       Allergies:  Codeine, Tramadol, Vicodin [hydrocodone-acetaminophen], and Geodon [ziprasidone hydrochloride]    Social History:  The patient currently homeless     TOBACCO:   reports that she has been smoking cigarettes. She has been smoking about 1.00 pack per day. She has never used smokeless tobacco.  ETOH:   reports no history of alcohol use. Family History:   Positive as follows:        Problem Relation Age of Onset    Cancer Father        REVIEW OF SYSTEMS:     Constitutional: Negative for fever   HENT: Negative for sore throat   Eyes: Negative for redness   Respiratory: Negative  for dyspnea, cough   Cardiovascular: Negative for chest pain   Gastrointestinal: Negative for vomiting, diarrhea   Genitourinary: Negative for hematuria   Musculoskeletal: Negative for arthralgias   Skin: Negative for rash +dry areas on bilateral feet   Neurological: Negative for syncope    Hematological: Negative for easy bruising/bleeding   Psychiatric/Behavorial: Per psychiatry team evaluation     PHYSICAL EXAM:    /71   Pulse 91   Temp 99.1 °F (37.3 °C) (Temporal)   Resp 16   Ht 5' 7\" (1.702 m)   Wt 184 lb 4.9 oz (83.6 kg)   LMP 12/26/2013   SpO2 99%   Breastfeeding No   BMI 28.87 kg/m²     Gen: No distress. Alert to self only. Eyes: PERRL. No sclera icterus. No conjunctival injection. ENT: No discharge. Pharynx clear. Neck: No JVD. Trachea midline. Resp: No accessory muscle use. No crackles. No wheezes. No rhonchi. CV: Regular rate. Regular rhythm. No murmur. No rub. No edema. GI: Non-tender. Non-distended. Normal bowel sounds. Skin: Warm and dry. No nodule on exposed extremities. No rash on exposed extremities. Scattered scars on BUE. Dry calloused areas on bilateral feet   M/S: No cyanosis. No joint deformity. No clubbing. Neuro: Awake. No focal neurologic deficit on exam.  Cranial nerves II through XII intact.   Patient refuses to ambulate at this time  Psych: Per psychiatry team evaluation     CBC:   Recent Labs     04/19/22  1008   WBC 7.1   HGB 14.5   HCT 44.0   MCV 89.2        BMP:   Recent Labs     04/19/22  1008      K 3.9      CO2 19*   BUN 12   CREATININE 0.5* LIVER PROFILE:   Recent Labs     04/19/22  1008   AST 14*   ALT 15   BILITOT 0.4   ALKPHOS 99     PT/INR: No results for input(s): PROTIME, INR in the last 72 hours. APTT: No results for input(s): APTT in the last 72 hours. UA:  Recent Labs     04/19/22  1015   COLORU Yellow   PHUR 6.0  6.0   CLARITYU Clear   SPECGRAV 1.015   LEUKOCYTESUR Negative   UROBILINOGEN 0.2   BILIRUBINUR Negative   BLOODU Negative   GLUCOSEU Negative        U/A:    Lab Results   Component Value Date    NITRITE neg 10/17/2012    COLORU Yellow 04/19/2022    WBCUA None seen 12/28/2013    RBCUA  12/28/2013    MUCUS Rare 08/07/2013    BACTERIA Rare 12/28/2013    CLARITYU Clear 04/19/2022    SPECGRAV 1.015 04/19/2022    LEUKOCYTESUR Negative 04/19/2022    BLOODU Negative 04/19/2022    GLUCOSEU Negative 04/19/2022       CULTURES  Results for Jeremy Thompson (MRN [id number]) as of 4/20/2022 13:21   Ref. Range 4/19/2022 11:53   SARS-CoV-2, NAAT Latest Ref Range: Not Detected  Not Detected       EKG:    Normal sinus rhythm with sinus arrhythmiaNormal ECGWhen compared with ECG of 13-AUG-2008 11:39,inverted T waves no longer present in inferior leadsConfirmed by [address] ([facility]) on 4/19/2022 1:19:46 PM    RADIOLOGY  No orders to display       ASSESSMENT/PLAN:  Acute Psychosis  Depression  - per psychiatry team    Polysubstance Abuse  - reports using marijuana   - UDS + THC and Cocaine   - counseled cessation    Tobacco Dependence  -Recommended cessation  - nicotine patch ordered     Pt has no medical complaints at this time. They were informed that should a medical concern arise during their admission they may have [facility] contact us.         Damián Caballero, APRN - CNP   4/20/2022

## 2022-04-20 NOTE — PROGRESS NOTES
Behavioral Services  Medicare Certification Upon Admission    I certify that this patient's inpatient psychiatric hospital admission is medically necessary for:    [x] (1) Treatment which could reasonably be expected to improve this patient's condition,       [x] (2) Or for diagnostic study;     AND     [x](2) The inpatient psychiatric services are provided while the individual is under the care of a physician and are included in the individualized plan of care.     Estimated length of stay/service 5-8 days    Plan for post-hospital care incomplete     Electronically signed by Roberto Carlos Mccracken MD on 4/20/2022 at 10:54 AM

## 2022-04-20 NOTE — BH NOTE
`[facility]al [facility] [place]  Admission Note     Admission Type:   Admission Type: Involuntary    Reason for admission:  Reason for Admission: Suicidal and Homicidal Ideation    PATIENT STRENGTHS:  Strengths: Social Skills,No significant Physical Illness    Patient Strengths and Limitations:  Limitations: Tendency to isolate self,Lacks leisure interests    Addictive Behavior:   Addictive Behavior  In the past 3 months, have you felt or has someone told you that you have a problem with:  : None  Do you have a history of Chemical Use?: Yes  Do you have a history of Alcohol Use?: Yes  Do you have a history of Street Drug Abuse?: Yes  Histroy of Prescripton Drug Abuse?: No    Medical Problems:   Past Medical History:   Diagnosis Date    Anxiety     Cancer ([facility] [address]     breast    Depression     Heart murmur     Hypertension        Status EXAM:  Status and Exam  Normal: No  Facial Expression: Flat  Affect: Constricted  Level of Consciousness: Alert  Mood:Normal: No  Mood: Depressed,Anxious,Sad  Motor Activity:Normal: No  Motor Activity: Decreased  Interview Behavior: Cooperative,Evasive  Preception: [place] to Person,[place] to Time,[place] to Place  Attention:Normal: No  Attention: Distractible  Thought Processes: Circumstantial  Thought Content:Normal: No  Thought Content: Paranoia,Poverty of Content  Hallucinations: None (denies)  Delusions: Yes  Delusions:  Other(See Comment) (paranoia)  Memory:Normal: No  Memory: Poor Recent  Insight and Judgment: No  Insight and Judgment: Poor Judgment,Poor Insight  Present Suicidal Ideation: No  Present Homicidal Ideation: No    Tobacco Screening:  Practical Counseling, on admission, mariama X, if applicable and completed (first 3 are required if patient doesn't refuse):            ( )  Recognizing danger situations (included triggers and roadblocks)                    ( )  Coping skills (new ways to manage stress, exercise, relaxation techniques, changing routine, distraction) ( )  Basic information about quitting (benefits of quitting, techniques in how to quit, available resources  ( ) Referral for counseling faxed to [facility]                                           ( ) Patient refused counseling  ( ) Patient has not smoked in the last 30 days    Metabolic Screening:    No results found for: LABA1C    No results for input(s): CHOL, TRIG, HDL, LDLCALC, LABVLDL in the last 72 hours. Body mass index is 28.87 kg/m². BP Readings from Last 2 Encounters:   04/19/22 118/73   04/19/22 101/69           Pt admitted with followings belongings:  Dental Appliances: Other (Comment) (None)  Vision - Corrective Lenses: Eyeglasses (in backpack)  Hearing Aid: None  Jewelry: Bracelet,Necklace (6 rubber bracelets and 1 rosary bead)  Body Piercings Removed: N/A  Clothing: Socks,Other (Comment),Pants,Shirt,Footwear,Undergarments (socks, pants, shirt, shoes, 2 bras, Backpack with: pack of undies, jeans,  case, phone case,)  Other Valuables: Money,Other (Comment),Cigarettes (in safe: 1 pack cigs, 1 knife, cell phone, 1 , 1 hotel card, $38 cash, $1.24 change)     Valuables sent home withN/A. Valuables placed in safe in security envelope, number:  Yes. Patient's home medications were N/A. Patient oriented to surroundings and program expectations and copy of patient rights given. Received admission packet:  Yes. Consents reviewed, signed Yes. Refused to sign in Voluntary. Patient verbalize understanding:  Yes. Patient education on precautions:  Yes                   Jorge Castellon RN

## 2022-04-20 NOTE — PLAN OF CARE
Problem: Pain:  Goal: Pain level will decrease  Description: Pain level will decrease  Outcome: Ongoing     Problem: Suicide risk  Goal: Provide patient with safe environment  Description: Provide patient with safe environment  Outcome: Ongoing     Problem: Falls - Risk of:  Goal: Will remain free from falls  Description: Will remain free from falls  Outcome: Ongoing     Floretta Hammans contracts for safety and currently denies SI/HI. Educated on fall safety due to history of falls.

## 2022-04-21 PROCEDURE — 6370000000 HC RX 637 (ALT 250 FOR IP): Performed by: PSYCHIATRY & NEUROLOGY

## 2022-04-21 PROCEDURE — 6370000000 HC RX 637 (ALT 250 FOR IP): Performed by: NURSE PRACTITIONER

## 2022-04-21 PROCEDURE — 99233 SBSQ HOSP IP/OBS HIGH 50: CPT | Performed by: PSYCHIATRY & NEUROLOGY

## 2022-04-21 PROCEDURE — 1240000000 HC EMOTIONAL WELLNESS R&B

## 2022-04-21 RX ADMIN — LORAZEPAM 2 MG: 2 TABLET ORAL at 08:20

## 2022-04-21 RX ADMIN — HYDROXYZINE HYDROCHLORIDE 50 MG: 50 TABLET, FILM COATED ORAL at 20:11

## 2022-04-21 RX ADMIN — NICOTINE POLACRILEX 2 MG: 2 LOZENGE ORAL at 08:20

## 2022-04-21 RX ADMIN — OLANZAPINE 10 MG: 10 TABLET, ORALLY DISINTEGRATING ORAL at 12:05

## 2022-04-21 RX ADMIN — PALIPERIDONE 6 MG: 3 TABLET, EXTENDED RELEASE ORAL at 08:20

## 2022-04-21 RX ADMIN — NICOTINE POLACRILEX 2 MG: 2 LOZENGE ORAL at 11:03

## 2022-04-21 ASSESSMENT — PAIN SCALES - GENERAL: PAINLEVEL_OUTOF10: 0

## 2022-04-21 NOTE — GROUP NOTE
Group Therapy Note    Date: 4/21/2022    Group Start Time: 1000  Group End Time: 6343  Group Topic: Cognitive Skills    [id number] [facility] [employer]        Group Therapy Note    Attendees: 11    Group members broke off into teams and engaged in multiple games of [employer]. Notes:  Lobito Ragland entered group at the end. Lobito Ragland remained appropriate during her time in group. Participation Level:  Active Listener and Interactive    Participation Quality: Appropriate, Attentive and Sharing      Speech:  mute      Thought Process/Content: Linear      Affective Functioning: Congruent      Mood: euthymic      Level of consciousness:  Alert      Response to Learning: Able to verbalize current knowledge/experience      Endings: None Reported    Modes of Intervention: Socialization, Exploration and Activity      Discipline Responsible: Behavorial Health Tech      Signature:  JASON Byrd

## 2022-04-21 NOTE — GROUP NOTE
Group Therapy Note    Date: 4/21/2022    Group Start Time: 1100  Group End Time: 4696  Group Topic: Psychoeducation    [facility]    JASON Talamantes        Group Therapy Note  Clinician introduced a mindfulness group by putting calming music on and having the members define mindfulness. The clinician provided blank paper with markers, paints or pastels for the members to draw their calm/safe place. The instructions were for them to draw the place that makes them feel calm and safe. They had to draw the things that they hear, smell, feel, taste and feel in their safe place while listening to the calming music. Attendees: 8         Patient's Goal:      Notes:  Patient was cooperative and fully engaged in the drawing, mindfulness activity. She was able to describe her safe place and the 5 senses that she connects to the feelings of safety and calmness.     Status After Intervention:  Unchanged    Participation Level: Interactive    Participation Quality: Resistant      Speech:  mute      Thought Process/Content: Linear      Affective Functioning: Flat      Mood: depressed      Level of consciousness:  Attentive      Response to Learning: Able to retain information      Endings: None Reported    Modes of Intervention: Education and Exploration      Discipline Responsible: /Counselor      Signature:  JASON Talamantes

## 2022-04-21 NOTE — PLAN OF CARE
Nursing shift report 1900 to present- Patient has been mainly isolative to her room but did come out briefly for group and snack. She denied SI but did report HI. She stated \" not to anyone here\". She would not tell this writer who she is homicidal towards and said \" the less people who know the better\". She also reported having AH but would not state what they say. She appeared distracted and was irritable at times and guarded. Patient given ativan 2 mg oral at 2104 for anxiety per pt. Request which was effective. She has a rash/ bug bites to her upper thighs , groin and abdomen which she stated is itching and painful. She reported that she got bit by bed bugs at a hotel. Dr. Ira Marroquin called and new orders for atarax for itching. Patient given motrin 600 mg at 2106 oral for pain from itching 10/10 and atarax 50 mg oral at 2241 for itching with results pending. Problem: Pain:  Goal: Pain level will decrease  Description: Pain level will decrease  4/20/2022 2317 by Cruz Roque RN  Outcome: Progressing    Problem: Suicide risk  Goal: Provide patient with safe environment  Description: Provide patient with safe environment  4/20/2022 2317 by Cruz Roque RN  Outcome: Progressing    Problem: Confusion  Goal: Confusion, delirium, dementia, or psychosis is improved or at baseline  Description: INTERVENTIONS:  1. Assess for possible contributors to thought disturbance, including medications, impaired vision or hearing, underlying metabolic abnormalities, dehydration, psychiatric diagnoses, and notify attending LIP  2. [place] high risk fall precautions, as indicated  3. Provide frequent short contacts to provide reality reorientation, refocusing and direction  4. Decrease environmental stimuli, including noise as appropriate  5. Monitor and intervene to maintain adequate nutrition, hydration, elimination, sleep and activity  6.  If unable to ensure safety without constant attention obtain sitter and review sitter guidelines with assigned personnel  7.  Initiate Psychosocial CNS and Spiritual Care consult, as indicated  Outcome: Progressing

## 2022-04-21 NOTE — PROGRESS NOTES
Atarax effective for itching and motrin effective for pain. Patent rested in bed quietly with no signs of distress. Safety checks continue.

## 2022-04-21 NOTE — GROUP NOTE
Group Therapy Note    Date: 4/20/2022    Group Start Time: 2025  Group End Time: 2045  Group Topic: Wrap-Up    [facility]        Group Therapy Note    Attendees: Goals and importance of goal setting discussed. Night time milieu activities discussed.          Patient's Goal:  Get some rest    Notes:  Successful     Status After Intervention:  Improved    Participation Level: Monopolizing    Participation Quality: Inappropriate and Intrusive      Speech:  pressured and slurred      Thought Process/Content: Delusional  Perseverating      Affective Functioning: Incongruent      Mood: depressed      Level of consciousness:  Preoccupied      Response to Learning: Progressing to goal      Endings: Homicidality    Modes of Intervention: Support      Discipline Responsible: [address]      Signature:  Dago Hardin

## 2022-04-22 PROCEDURE — 99233 SBSQ HOSP IP/OBS HIGH 50: CPT | Performed by: PSYCHIATRY & NEUROLOGY

## 2022-04-22 PROCEDURE — 1240000000 HC EMOTIONAL WELLNESS R&B

## 2022-04-22 PROCEDURE — 6370000000 HC RX 637 (ALT 250 FOR IP): Performed by: PSYCHIATRY & NEUROLOGY

## 2022-04-22 PROCEDURE — 6370000000 HC RX 637 (ALT 250 FOR IP): Performed by: NURSE PRACTITIONER

## 2022-04-22 RX ADMIN — IBUPROFEN 600 MG: 600 TABLET ORAL at 21:50

## 2022-04-22 RX ADMIN — NICOTINE POLACRILEX 2 MG: 2 LOZENGE ORAL at 17:58

## 2022-04-22 RX ADMIN — HYDROXYZINE HYDROCHLORIDE 50 MG: 50 TABLET, FILM COATED ORAL at 18:17

## 2022-04-22 RX ADMIN — LORAZEPAM 2 MG: 2 TABLET ORAL at 21:09

## 2022-04-22 RX ADMIN — NICOTINE POLACRILEX 2 MG: 2 LOZENGE ORAL at 09:33

## 2022-04-22 RX ADMIN — PALIPERIDONE 6 MG: 3 TABLET, EXTENDED RELEASE ORAL at 09:20

## 2022-04-22 ASSESSMENT — PAIN DESCRIPTION - ORIENTATION: ORIENTATION: RIGHT

## 2022-04-22 ASSESSMENT — PAIN SCALES - GENERAL
PAINLEVEL_OUTOF10: 0
PAINLEVEL_OUTOF10: 7

## 2022-04-22 ASSESSMENT — PAIN DESCRIPTION - LOCATION: LOCATION: TEETH

## 2022-04-22 NOTE — FLOWSHEET NOTE
Purposeful Rounding    Patient Location: Nurses station    Patient willing to engage in conversation: Yes    Presentation/behavior: Cooperative    Affect: Flat/blunted    Concerns reported: pt asking if we have any extra clothes. Pt was given a new gown and paper pants and her clothes were placed in the washer.     PRN medications given: n/a    Environmental assessment: Room free from clutter, Clear path to bathroom  and No safety hazards noted    Fall prevention interventions in place: Yellow non-skid socks on    Daily [place] Fall Risk Score: 89    Daily [place] Fall Risk Score: 0-low      Electronically signed by Lyndon Tate RN on 4/22/22 at 7:04 PM EDT

## 2022-04-22 NOTE — GROUP NOTE
Group Therapy Note    Date: 4/22/2022    Group Start Time: 1100  Group End Time: 4907  Group Topic: Psychoeducation    [facility]    JASON Frazier        Group Therapy Note  Clinician introduced character strengths to the group. They circled their top 5 strengths then completed a worksheet about their top strengths and how they used them to overcome an adversity. The group members were all able to complete the activity and took turns reading their positive achievement to other group members. The group member supported and validated the members when they shared. Attendees: 10         Patient's Goal:      Notes:  Patient was cooperative and fully engaged in the group. Patient was able to identify their top strengths and how they used them in the past and shared with the group. Status After Intervention:  Improved    Participation Level:  Active Listener    Participation Quality: Sharing      Speech:  normal      Thought Process/Content: Linear      Affective Functioning: Incongruent      Mood: anxious      Level of consciousness:  Attentive      Response to Learning: Able to retain information      Endings: None Reported    Modes of Intervention: Education      Discipline Responsible: /Counselor      Signature:  JASON Frazier

## 2022-04-22 NOTE — FLOWSHEET NOTE
Purposeful Rounding    Patient Location: Day room    Patient willing to engage in conversation: Yes    Presentation/behavior: Cooperative    Affect: Neutral/Euthymic(normal)    Concerns reported: none    PRN medications given: n/a    Environmental assessment: Room free from clutter    Fall prevention interventions in place: Yellow non-skid socks on     Daily [place] Fall Risk: 89    Daily [place] Fall Risk Score: 0-low      Electronically signed by Kashif Choudhary RN on 4/22/22 at 11:12 AM EDT

## 2022-04-22 NOTE — PROGRESS NOTES
Department of Psychiatry  Progress Note    Patient's chart was reviewed. Discussed with treatment team. Met with patient. SUBJECTIVE:      Making gains though still guarded. Multiple negative symptoms. Tolerating Increased dose of Invega. Is not interested in [place]. Is interested in a residential program for substance use disorder. ROS:   Patient has new complaints: no  Sleeping adequately:  Yes   Appetite adequate: Yes  Engaged in programming: some    OBJECTIVE:  VITALS:  BP 99/63   Pulse 101   Temp 98.4 °F (36.9 °C) (Temporal)   Resp 16   Ht 5' 7\" (1.702 m)   Wt 184 lb 4.9 oz (83.6 kg)   LMP 12/26/2013   SpO2 96%   Breastfeeding No   BMI 28.87 kg/m²     Mental Status Examination:    Appearance: poor grooming and hygiene  Behavior/Attitude toward examiner:  poor eye contact  Speech: poverty  Mood:  \"fine\"  Affect:  blunted     Thought processes:  disorganized. Paucity  Thought Content: no SI, no HI, less delusional; paranoid  Perceptions: still RTIS - mumbling to herself in the milieu. Attention: limited  Abstraction: impaired  Cognition:  Alert and oriented to person, place, time, and situation, recall intact  Insight: Limited insight   Judgment: Limited judgment     Medication:  Scheduled:   paliperidone  6 mg Oral Daily    nicotine  1 patch TransDERmal Daily        PRN:  OLANZapine zydis, nicotine polacrilex, hydrOXYzine, LORazepam, ibuprofen     FORMULATION:  This is a homeless and unemployed 59-year-old with a  history of multiple psychiatric diagnoses and substance use, who  self-presented to [facility] Emergency Department with psychotic  symptoms. The patient presents with disorganized thinking, delusional  thought, and impaired judgment. This in the setting of ongoing  substance use including urine drug screen was positive for cocaine and  cannabis. Given the severity of her symptoms and past history, she  requires inpatient stabilization and treatment.     Principal Problem:    [facility]  Active Problems:    Amphetamine use disorder, severe, in controlled environment ([facility]    Cocaine use disorder, severe, in controlled environment ([facility]    Cannabis use disorder, severe, in controlled environment ([facility]    Opioid use disorder, severe, in controlled environment ([facility]    Tobacco use  Resolved Problems:    * No resolved hospital problems. *     PLAN:  1. Admitted to Inpatient Psychiatry for stabilization and treatment. 2.  On admission, started Invega 3 mg. Ordered q.15-minute checks for safety, programming, and p.r.n. medication for anxiety, agitation, and insomnia. 4/21/2022 - increased Invega to 6mg. 3. Internal Medicine consult for admission. No additional findings. 4.   Estimated length of stay 5-8 days. The patient was admitted on a statement of belief, but agreed to  stay on a voluntary basis. Total time with patient was 35 minutes and more than 50 % of that time was spent counseling the patient on their symptoms, treatment, and expected goals.      Joselyn Lopez MD

## 2022-04-22 NOTE — PLAN OF CARE
Problem: Pain:  Goal: Pain level will decrease  Description: Pain level will decrease  Outcome: Progressing     Problem: Pain:  Goal: Control of acute pain  Description: Control of acute pain  Outcome: Progressing     Problem: Pain:  Goal: Control of chronic pain  Description: Control of chronic pain  Outcome: Progressing     Problem: Suicide risk  Goal: Provide patient with safe environment  Description: Provide patient with safe environment  Outcome: Progressing     Problem: Falls - Risk of:  Goal: Will remain free from falls  Description: Will remain free from falls  Outcome: Progressing     Problem: Falls - Risk of:  Goal: Absence of physical injury  Description: Absence of physical injury  Outcome: Progressing     Problem: Confusion  Goal: Confusion, delirium, dementia, or psychosis is improved or at baseline  Description: INTERVENTIONS:  1. Assess for possible contributors to thought disturbance, including medications, impaired vision or hearing, underlying metabolic abnormalities, dehydration, psychiatric diagnoses, and notify attending LIP  2. [place] high risk fall precautions, as indicated  3. Provide frequent short contacts to provide reality reorientation, refocusing and direction  4. Decrease environmental stimuli, including noise as appropriate  5. Monitor and intervene to maintain adequate nutrition, hydration, elimination, sleep and activity  6. If unable to ensure safety without constant attention obtain sitter and review sitter guidelines with assigned personnel  7. Initiate Psychosocial CNS and Spiritual Care consult, as indicated  Outcome: Progressing       Patient visible on unit. Social with select peers. She did attend and participated in few groups. She was medication compliant. She was calm and cooperative with interview. She denies SI/HI/AVH. Patient did not make any delusional statements this shift and has been able to verbalize her needs to staff.

## 2022-04-22 NOTE — FLOWSHEET NOTE
Purposeful Rounding    Patient Location: Day room    Patient willing to engage in conversation: Yes    Presentation/behavior: Cooperative and Pleasant    Affect: Flat/blunted    Concerns reported: none    PRN medications given: n/a    Environmental assessment: Room free from clutter, Clear path to bathroom  and No safety hazards noted    Fall prevention interventions in place: Yellow non-skid socks on    Daily [place] Fall Risk Score: 89    Daily [place] Fall Risk Score:0-low      Electronically signed by Carmen Kaye RN on 4/22/22 at 8:50 AM EDT

## 2022-04-22 NOTE — PROGRESS NOTES
Department of Psychiatry  Progress Note    Patient's chart was reviewed. Discussed with treatment team. Met with patient. SUBJECTIVE:      Remains psychotic though less agitated since admission and starting treatment. One-word responses to most questions. Tolerating Invega well so far. Discussed [place]; she agreed to think about it. Has nowhere to go. No support. ROS:   Patient has new complaints: no  Sleeping adequately:  Yes   Appetite adequate: Yes  Engaged in programming: some    OBJECTIVE:  VITALS:  BP 99/63   Pulse 101   Temp 98.4 °F (36.9 °C) (Temporal)   Resp 16   Ht 5' 7\" (1.702 m)   Wt 184 lb 4.9 oz (83.6 kg)   LMP 12/26/2013   SpO2 96%   Breastfeeding No   BMI 28.87 kg/m²     Mental Status Examination:    Appearance: poor grooming and hygiene  Behavior/Attitude toward examiner:  poor eye contact  Speech: poverty  Mood:  \"ok\"  Affect:  blunted     Thought processes:  disorganized   Thought Content: no SI, no HI, delusional; paranoid  Perceptions: +RTIS  Attention: limited  Abstraction: impaired  Cognition:  Alert and oriented to person, place, time, and situation, recall intact  Insight: Limited insight   Judgment: Limited judgment     Medication:  Scheduled:   paliperidone  6 mg Oral Daily    nicotine  1 patch TransDERmal Daily        PRN:  OLANZapine zydis, nicotine polacrilex, hydrOXYzine, LORazepam, ibuprofen     FORMULATION:  This is a homeless and unemployed 66-year-old with a  history of multiple psychiatric diagnoses and substance use, who  self-presented to [facility] Emergency Department with psychotic  symptoms. The patient presents with disorganized thinking, delusional  thought, and impaired judgment. This in the setting of ongoing  substance use including urine drug screen was positive for cocaine and  cannabis. Given the severity of her symptoms and past history, she  requires inpatient stabilization and treatment.     Principal Problem:    Psychosis [facility])  Active Problems:    Amphetamine use disorder, severe, in controlled environment ([facility]    Cocaine use disorder, severe, in controlled environment ([facility]    Cannabis use disorder, severe, in controlled environment ([facility]    Opioid use disorder, severe, in controlled environment [facility]    Tobacco use  Resolved Problems:    * No resolved hospital problems. *     PLAN:  1. Admitted to Inpatient Psychiatry for stabilization and treatment. 2.  On admission, started Invega 3 mg. Ordered q.15-minute checks for safety, programming, and p.r.n. medication for anxiety, agitation, and insomnia. 4/21/2022 - increase Invega to 6mg. 3. Internal Medicine consult for admission. No additional findings. 4.   Estimated length of stay 5-8 days. The patient was admitted on a statement of belief, but agreed to  stay on a voluntary basis. Total time with patient was 35 minutes and more than 50 % of that time was spent counseling the patient on their symptoms, treatment, and expected goals.      Evelin Mazariegos MD

## 2022-04-22 NOTE — FLOWSHEET NOTE
Purposeful Rounding    Patient Location: Patient room    Patient willing to engage in conversation: No    Presentation/behavior: Other resting*    Affect: Neutral/Euthymic(normal)    Concerns reported: none, pt noted to be resting, eye closed, respirations even and easy    PRN medications given: n/a    Environmental assessment: Room free from clutter, Clear path to bathroom  and No safety hazards noted    Fall prevention interventions in place: Yellow non-skid socks on    Daily [place] Fall Risk Score: 89    Daily [place] Fall Risk Score: 0-low      Electronically signed by Isamar Contreras RN on 4/22/22 at 2:13 PM EDT

## 2022-04-22 NOTE — PROGRESS NOTES
Patient alert and oriented. Wanting something for anxiety. Atarax given at 2010. Denies SI/HI ideations. Denies hallucinations. Speech clear. Rocking back and forth while being interviewed. Affect flat. Reassessed after atarax given, patient asleep with resp easy and even.

## 2022-04-23 PROCEDURE — 6370000000 HC RX 637 (ALT 250 FOR IP): Performed by: PSYCHIATRY & NEUROLOGY

## 2022-04-23 PROCEDURE — 1240000000 HC EMOTIONAL WELLNESS R&B

## 2022-04-23 PROCEDURE — 6370000000 HC RX 637 (ALT 250 FOR IP): Performed by: NURSE PRACTITIONER

## 2022-04-23 RX ORDER — DIMETHICONE, OXYBENZONE, AND PADIMATE O 2; 2.5; 6.6 G/100G; G/100G; G/100G
STICK TOPICAL PRN
Status: DISCONTINUED | OUTPATIENT
Start: 2022-04-23 | End: 2022-04-25 | Stop reason: HOSPADM

## 2022-04-23 RX ADMIN — Medication: at 18:35

## 2022-04-23 RX ADMIN — NICOTINE POLACRILEX 2 MG: 2 LOZENGE ORAL at 14:16

## 2022-04-23 RX ADMIN — LORAZEPAM 2 MG: 2 TABLET ORAL at 20:15

## 2022-04-23 RX ADMIN — HYDROXYZINE HYDROCHLORIDE 50 MG: 50 TABLET, FILM COATED ORAL at 17:32

## 2022-04-23 RX ADMIN — NICOTINE POLACRILEX 2 MG: 2 LOZENGE ORAL at 09:19

## 2022-04-23 RX ADMIN — NICOTINE POLACRILEX 2 MG: 2 LOZENGE ORAL at 15:20

## 2022-04-23 RX ADMIN — PALIPERIDONE 6 MG: 3 TABLET, EXTENDED RELEASE ORAL at 08:56

## 2022-04-23 RX ADMIN — OLANZAPINE 10 MG: 10 TABLET, ORALLY DISINTEGRATING ORAL at 20:15

## 2022-04-23 RX ADMIN — IBUPROFEN 600 MG: 600 TABLET ORAL at 15:45

## 2022-04-23 RX ADMIN — LORAZEPAM 2 MG: 2 TABLET ORAL at 08:56

## 2022-04-23 ASSESSMENT — PAIN - FUNCTIONAL ASSESSMENT: PAIN_FUNCTIONAL_ASSESSMENT: ACTIVITIES ARE NOT PREVENTED

## 2022-04-23 ASSESSMENT — PAIN DESCRIPTION - LOCATION: LOCATION: TEETH

## 2022-04-23 ASSESSMENT — PAIN SCALES - GENERAL: PAINLEVEL_OUTOF10: 6

## 2022-04-23 ASSESSMENT — PAIN DESCRIPTION - DESCRIPTORS: DESCRIPTORS: ACHING

## 2022-04-23 ASSESSMENT — PAIN DESCRIPTION - ORIENTATION: ORIENTATION: RIGHT;LOWER

## 2022-04-23 NOTE — PROGRESS NOTES
Patient continues with anxiety. Rates her anxiety a 6 on a 1-10 scale. Ativan given per order.   See [facility]

## 2022-04-23 NOTE — FLOWSHEET NOTE
Purposeful Rounding    Patient Location: Day room    Patient willing to engage in conversation: Yes    Presentation/behavior: Cooperative and Pleasant    Affect: Neutral/Euthymic(normal)    Concerns reported: pt requesting additional food, patient offered few snacks.     PRN medications given: n/a    Environmental assessment: Room free from clutter, Clear path to bathroom  and No safety hazards noted    Fall prevention interventions in place: Yellow non-skid socks on    Daily [place] Fall Risk Score: 89    Daily [place] Fall Risk Score: 0-low      Electronically signed by Maninder Méndez RN on 4/23/22 at 1:14 PM EDT

## 2022-04-23 NOTE — PROGRESS NOTES
Patient came to desk requesting medication for tooth pain. She rates her pain a 6 on a 1-10 scale.   Ibuprofen given per order, see MAR

## 2022-04-23 NOTE — PROGRESS NOTES
Patient requesting medication for her anxiety. Patient educated on utilizing coping skills prior to receiving medication. Patient states she did try coloring and talking with peers. Patient given Hydroxyzine per order of anxiety.   See [facility]

## 2022-04-23 NOTE — FLOWSHEET NOTE
Purposeful Rounding    Patient Location: Day room    Patient willing to engage in conversation: Yes    Presentation/behavior: Cooperative and Pleasant    Affect: Neutral/Euthymic(normal)    Concerns reported: none, pt noted to be sitting in day room playing cards with peers    PRN medications given: n/a    Environmental assessment: Room free from clutter, Clear path to bathroom  and No safety hazards noted    Fall prevention interventions in place: Yellow non-skid socks on    Daily [place] Fall Risk Score: 89    Daily [place] Fall Risk Score: 0-low      Electronically signed by Sarah Parker RN on 4/23/22 at 11:02 AM EDT

## 2022-04-23 NOTE — PROGRESS NOTES
Reassessment of PRN Ativan. Patient noted to be in day room, coloring with peers. Patient stated medication was effective.

## 2022-04-23 NOTE — PROGRESS NOTES
Reassessment of PRN Hydroxyzine. Patient states medication was somewhat effective but that she continues to have anxiety. Patient noted to be sitting with peers, watching TV at this time.

## 2022-04-23 NOTE — FLOWSHEET NOTE
Purposeful Rounding    Patient Location: Day room    Patient willing to engage in conversation: Yes    Presentation/behavior: Anxious    Affect: Neutral/Euthymic(normal)    Concerns reported: pt states she is feeling anxious. Patient encouraged to utilize coping skills.   Patient agreeable    PRN medications given: none    Environmental assessment: Room free from clutter, Clear path to bathroom  and No safety hazards noted    Fall prevention interventions in place: Yellow non-skid socks on    Daily [place] Fall Risk Score: 89    Daily [place] Fall Risk Score: 0-low      Electronically signed by Bella Shelby RN on 4/23/22 at 10:59 AM EDT

## 2022-04-23 NOTE — PLAN OF CARE
Problem: Pain:  Goal: Control of acute pain  Description: Control of acute pain  Outcome: Progressing     Problem: Suicide risk  Goal: Provide patient with safe environment  Description: Provide patient with safe environment  Outcome: Progressing     Problem: Falls - Risk of:  Goal: Will remain free from falls  Description: Will remain free from falls  Outcome: Progressing     Problem: Falls - Risk of:  Goal: Absence of physical injury  Description: Absence of physical injury  Outcome: Progressing     Problem: Confusion  Goal: Confusion, delirium, dementia, or psychosis is improved or at baseline  Description: INTERVENTIONS:  1. Assess for possible contributors to thought disturbance, including medications, impaired vision or hearing, underlying metabolic abnormalities, dehydration, psychiatric diagnoses, and notify attending LIP  2. [place] high risk fall precautions, as indicated  3. Provide frequent short contacts to provide reality reorientation, refocusing and direction  4. Decrease environmental stimuli, including noise as appropriate  5. Monitor and intervene to maintain adequate nutrition, hydration, elimination, sleep and activity  6. If unable to ensure safety without constant attention obtain sitter and review sitter guidelines with assigned personnel  7. Initiate Psychosocial CNS and Spiritual Care consult, as indicated  Outcome: Progressing     Patient visible on unit. Social with peers. She has been medication complaint. She has been free from falls and any injuries this shift. She was  calm and cooperative with interview. She denies SI/HI/AVH. No delusional heard. Patient states overall her mood has improved and that her thoughts are much clearer today. She utilized PRN medication for increased anxiety and Ibuprofen for tooth pain which was effective.

## 2022-04-24 PROCEDURE — 6370000000 HC RX 637 (ALT 250 FOR IP): Performed by: NURSE PRACTITIONER

## 2022-04-24 PROCEDURE — 6370000000 HC RX 637 (ALT 250 FOR IP): Performed by: PSYCHIATRY & NEUROLOGY

## 2022-04-24 PROCEDURE — 99233 SBSQ HOSP IP/OBS HIGH 50: CPT | Performed by: PSYCHIATRY & NEUROLOGY

## 2022-04-24 PROCEDURE — 1240000000 HC EMOTIONAL WELLNESS R&B

## 2022-04-24 RX ADMIN — NICOTINE POLACRILEX 2 MG: 2 LOZENGE ORAL at 23:45

## 2022-04-24 RX ADMIN — IBUPROFEN 600 MG: 600 TABLET ORAL at 21:00

## 2022-04-24 RX ADMIN — LORAZEPAM 2 MG: 2 TABLET ORAL at 14:59

## 2022-04-24 RX ADMIN — OLANZAPINE 10 MG: 10 TABLET, ORALLY DISINTEGRATING ORAL at 21:00

## 2022-04-24 RX ADMIN — NICOTINE POLACRILEX 2 MG: 2 LOZENGE ORAL at 14:59

## 2022-04-24 RX ADMIN — NICOTINE POLACRILEX 2 MG: 2 LOZENGE ORAL at 18:51

## 2022-04-24 RX ADMIN — NICOTINE POLACRILEX 2 MG: 2 LOZENGE ORAL at 10:05

## 2022-04-24 RX ADMIN — PALIPERIDONE 6 MG: 3 TABLET, EXTENDED RELEASE ORAL at 09:57

## 2022-04-24 RX ADMIN — LORAZEPAM 2 MG: 2 TABLET ORAL at 21:00

## 2022-04-24 RX ADMIN — LORAZEPAM 2 MG: 2 TABLET ORAL at 02:25

## 2022-04-24 RX ADMIN — NICOTINE POLACRILEX 2 MG: 2 LOZENGE ORAL at 12:16

## 2022-04-24 RX ADMIN — HYDROXYZINE HYDROCHLORIDE 50 MG: 50 TABLET, FILM COATED ORAL at 00:19

## 2022-04-24 ASSESSMENT — PAIN SCALES - GENERAL
PAINLEVEL_OUTOF10: 6
PAINLEVEL_OUTOF10: 0

## 2022-04-24 ASSESSMENT — PAIN DESCRIPTION - LOCATION: LOCATION: TEETH

## 2022-04-24 ASSESSMENT — PAIN DESCRIPTION - DESCRIPTORS: DESCRIPTORS: ACHING

## 2022-04-24 NOTE — PLAN OF CARE
Leydi has been anxious but cooperative tonight. Denies SI/HI and denies hallucinations. PRN Ativan and Zyprexa given which was effective. Flat affect, but has been visible in [employer] with peers coloring and listening to music. She attended PM group tonight. Will continue to monitor.

## 2022-04-24 NOTE — PROGRESS NOTES
Patient was awake & to the team station,\"I think it's time for the white pill. \" Patient then stated she wanted something for sleep & was given atarax 50 mg po. Patient was given some snack sper request & returned to her room.  Pamela Cardenas R.N.

## 2022-04-24 NOTE — FLOWSHEET NOTE
Purposeful Rounding    Patient Location: Day room    Patient willing to engage in conversation: Yes    Presentation/behavior: Cooperative and Pleasant    Affect: Brightens with interaction    Concerns reported: none    PRN medications given: n/a    Environmental assessment: Room free from clutter, Clear path to bathroom  and No safety hazards noted    Fall prevention interventions in place: Yellow non-skid socks on    Daily [place] Fall Risk Score:89    Daily [place] Fall Risk Score: 0-low      Electronically signed by Rafaela Stanton RN on 4/24/22 at 9:49 AM EDT

## 2022-04-24 NOTE — PROGRESS NOTES
Department of Psychiatry  Progress Note    Patient's chart was reviewed. Discussed with treatment team. Met with patient. SUBJECTIVE:      Severe negative symptoms. Positive symptoms much improved. Frequently requesting PRNs for anxiety. Unfortunately, is no-longer interested in a residential program for substance use. Wants to go to a shelter after discharge. Remains uninterested in a [facility].    ROS:   Patient has new complaints: no  Sleeping adequately:  Yes   Appetite adequate: Yes  Engaged in programming: some    OBJECTIVE:  VITALS:  /68   Pulse 106   Temp 97.8 °F (36.6 °C) (Temporal)   Resp 16   Ht 5' 7\" (1.702 m)   Wt 184 lb 4.9 oz (83.6 kg)   LMP 12/26/2013   SpO2 96%   Breastfeeding No   BMI 28.87 kg/m²     Mental Status Examination:    Appearance: improved grooming and hygiene  Behavior/Attitude toward examiner:  improved eye contact  Speech: poverty  Mood:  \"fine\"  Affect:  FLAT   Thought processes:  less disorganized. Paucity  Thought Content: no SI, no HI, less delusional; paranoid  Perceptions: less RTIS  Attention: limited  Abstraction: impaired  Cognition:  Alert and oriented to person, place, time, and situation, recall intact  Insight: Limited insight   Judgment: Limited judgment     Medication:  Scheduled:   paliperidone  6 mg Oral Daily    nicotine  1 patch TransDERmal Daily        PRN:  medicated lip balm, OLANZapine zydis, nicotine polacrilex, hydrOXYzine, LORazepam, ibuprofen     FORMULATION:  This is a homeless and unemployed 19-year-old with a  history of multiple psychiatric diagnoses and substance use, who  self-presented to [facility] Emergency Department with psychotic  symptoms. The patient presents with disorganized thinking, delusional  thought, and impaired judgment. This in the setting of ongoing  substance use including urine drug screen was positive for cocaine and  cannabis.   Given the severity of her symptoms and past history, she  requires inpatient stabilization and treatment. Principal Problem:    Psychosis ([facility] [address]  Active Problems:    Amphetamine use disorder, severe, in controlled environment ([facility] [address]    Cocaine use disorder, severe, in controlled environment ([facility] [address]    Cannabis use disorder, severe, in controlled environment ([facility] [address]    Opioid use disorder, severe, in controlled environment ([facility] [address]    Tobacco use  Resolved Problems:    * No resolved hospital problems. *     PLAN:  1. Admitted to Inpatient Psychiatry for stabilization and treatment. 2.  On admission, started Invega 3 mg. Ordered q.15-minute checks for safety, programming, and p.r.n. medication for anxiety, agitation, and insomnia. 4/21/2022 - increased Invega to 6mg. 3. Internal Medicine consult for admission. No additional findings. 4.   Estimated length of stay 5-8 days. The patient was admitted on a statement of belief, but agreed to  stay on a voluntary basis. [employer] DC tomorrow. Total time with patient was 35 minutes and more than 50 % of that time was spent counseling the patient on their symptoms, treatment, and expected goals.      Carleen Whitaker MD

## 2022-04-24 NOTE — PLAN OF CARE
Problem: Pain:  Goal: Control of acute pain  Description: Control of acute pain  Outcome: Progressing     Problem: Suicide risk  Goal: Provide patient with safe environment  Description: Provide patient with safe environment  Outcome: Progressing     Problem: Falls - Risk of:  Goal: Will remain free from falls  Description: Will remain free from falls  Outcome: Progressing     Problem: Falls - Risk of:  Goal: Absence of physical injury  Description: Absence of physical injury  Outcome: Progressing     Problem: Confusion  Goal: Confusion, delirium, dementia, or psychosis is improved or at baseline  Description: INTERVENTIONS:  1. Assess for possible contributors to thought disturbance, including medications, impaired vision or hearing, underlying metabolic abnormalities, dehydration, psychiatric diagnoses, and notify attending LIP  2. [place] high risk fall precautions, as indicated  3. Provide frequent short contacts to provide reality reorientation, refocusing and direction  4. Decrease environmental stimuli, including noise as appropriate  5. Monitor and intervene to maintain adequate nutrition, hydration, elimination, sleep and activity  6. If unable to ensure safety without constant attention obtain sitter and review sitter guidelines with assigned personnel  7. Initiate Psychosocial CNS and Spiritual Care consult, as indicated  Outcome: Progressing    Patient visible on unit. Social with peers. She was medication compliant. Patient having increased anxiety today, states that she is feeling \"off\", and she is more paranoid today. Worried about living arrangements once she is discharged. Does seem more preoccupied today. She has utilized PRN medications for anxiety. She was calm and cooperative with interview, denies SI/HI/AVH. She has been free from falls and absent of any injury this shift.

## 2022-04-24 NOTE — BH NOTE
Group Therapy Note    Date: 4/23/2022  Start Time: 20:00  End Time:  21:00  Number of Participants: 13    Type of Group: Recreational  Wrap up    Aniceto Hines Information  Module Name:  /  Session Number:  /    Patient's Goal:  Coping skills    Notes:  Continuing to work on goal    Status After Intervention:  Unchanged    Participation Level:  Active Listener and Interactive    Participation Quality: Appropriate and Sharing      Speech:  normal      Thought Process/Content: Logical      Affective Functioning: Blunted      Mood: anxious      Level of consciousness:  Alert and Attentive      Response to Learning: Able to change behavior      Endings: None Reported    Modes of Intervention: Socialization and Problem-solving      Discipline Responsible: Behavorial Health Tech      Signature:  Alesha Arias

## 2022-04-25 VITALS
HEART RATE: 86 BPM | RESPIRATION RATE: 15 BRPM | OXYGEN SATURATION: 98 % | BODY MASS INDEX: 28.93 KG/M2 | TEMPERATURE: 98.2 F | SYSTOLIC BLOOD PRESSURE: 91 MMHG | HEIGHT: 67 IN | WEIGHT: 184.31 LBS | DIASTOLIC BLOOD PRESSURE: 52 MMHG

## 2022-04-25 PROCEDURE — 99239 HOSP IP/OBS DSCHRG MGMT >30: CPT | Performed by: PSYCHIATRY & NEUROLOGY

## 2022-04-25 PROCEDURE — 6370000000 HC RX 637 (ALT 250 FOR IP): Performed by: NURSE PRACTITIONER

## 2022-04-25 PROCEDURE — 6370000000 HC RX 637 (ALT 250 FOR IP): Performed by: PSYCHIATRY & NEUROLOGY

## 2022-04-25 PROCEDURE — 5130000000 HC BRIDGE APPOINTMENT

## 2022-04-25 RX ORDER — LORAZEPAM 2 MG/1
2 TABLET ORAL EVERY 6 HOURS PRN
Qty: 5 TABLET | Refills: 0 | Status: SHIPPED | OUTPATIENT
Start: 2022-04-25 | End: 2022-04-25

## 2022-04-25 RX ORDER — LORAZEPAM 2 MG/1
2 TABLET ORAL EVERY 6 HOURS PRN
Qty: 5 TABLET | Refills: 0 | Status: SHIPPED | OUTPATIENT
Start: 2022-04-25 | End: 2022-05-02

## 2022-04-25 RX ORDER — PALIPERIDONE 6 MG/1
6 TABLET, EXTENDED RELEASE ORAL DAILY
Qty: 30 TABLET | Refills: 0 | Status: SHIPPED | OUTPATIENT
Start: 2022-04-26

## 2022-04-25 RX ADMIN — IBUPROFEN 600 MG: 600 TABLET ORAL at 08:31

## 2022-04-25 RX ADMIN — NICOTINE POLACRILEX 2 MG: 2 LOZENGE ORAL at 11:02

## 2022-04-25 RX ADMIN — HYDROXYZINE HYDROCHLORIDE 50 MG: 50 TABLET, FILM COATED ORAL at 02:19

## 2022-04-25 RX ADMIN — PALIPERIDONE 6 MG: 3 TABLET, EXTENDED RELEASE ORAL at 08:31

## 2022-04-25 RX ADMIN — LORAZEPAM 2 MG: 2 TABLET ORAL at 08:35

## 2022-04-25 RX ADMIN — NICOTINE POLACRILEX 2 MG: 2 LOZENGE ORAL at 08:31

## 2022-04-25 ASSESSMENT — PAIN DESCRIPTION - DESCRIPTORS: DESCRIPTORS: ACHING

## 2022-04-25 ASSESSMENT — PAIN SCALES - GENERAL
PAINLEVEL_OUTOF10: 6
PAINLEVEL_OUTOF10: 0

## 2022-04-25 ASSESSMENT — PAIN DESCRIPTION - LOCATION: LOCATION: TEETH

## 2022-04-25 NOTE — BH NOTE
Bridge Appointment completed: Reviewed Discharge Instructions with patient. Patient verbalizes understanding and agreement with the discharge plan using the teachback method. Referral for Outpatient Tobacco Cessation Counseling, upon discharge (mariama X if applicable and completed):    ( )  Hospital staff assisted patient to call Quit Line or faxed referral                                   during hospitalization                  ( )  Recognizing danger situations (included triggers and roadblocks), if not completed on admission                    ( )  Coping skills (new ways to manage stress, exercise, relaxation techniques, changing routine, distraction), if not completed on admission                                                           ( )  Basic information about quitting (benefits of quitting, techniques in how to quit, available resources, if not completed on admission  ( ) Referral for counseling faxed to [facility]   (x ) Patient refused referral  (x ) Patient refused counseling  (x ) Patient refused smoking cessation medication upon discharge    Vaccinations (mariama X if applicable and completed):  ( ) Patient states already received influenza vaccine elsewhere  ( ) Patient received influenza vaccine during this hospitalization  (x ) Patient refused influenza vaccine at this time    [address] [place] [address]  Discharge Note    Pt discharged with followings belongings:   Dental Appliances:  Other (Comment) (None)  Vision - Corrective Lenses: Eyeglasses (in backpack)  Hearing Aid: None  Jewelry: Bracelet,Necklace (6 rubber bracelets and 1 rosary bead)  Body Piercings Removed: N/A  Clothing: Socks,Other (Comment),Pants,Shirt,Footwear,Undergarments (socks, pants, shirt, shoes, 2 bras, Backpack with: pack of undies, jeans,  case, phone case,)  Other Valuables: Money,Other (Comment),Cigarettes (in safe: 1 pack cigs, 1 knife, cell phone, 1 , 1 hotel card, $38 cash, $1.24 change)   Valuables sent home with patient or returned to patient. Patient education on aftercare instructions: yes  Information faxed to  by   at 12:48 PM .Patient verbalize understanding of AVS:  yes. Status EXAM upon discharge:  Mental Status and Behavioral Exam  Normal: No  Level of Assistance: Independent/Self  Facial Expression: Flat  Affect: Appropriate  Level of Consciousness: Alert  Frequency of Checks: 4 times per hour, close  Mood:Normal: No  Mood: Anxious  Motor Activity:Normal: Yes  Motor Activity: Decreased  Eye Contact: Good  Observed Behavior: Cooperative,Friendly  Sexual Misconduct History: Current - no  Preception: [place] to person,[place] to time,[place] to place,[place] to situation  Attention:Normal: No  Attention: Distractible  Thought Processes: Circumstantial  Thought Content:Normal: No  Thought Content: Paranoia  Depression Symptoms: Impaired concentration  Anxiety Symptoms: Generalized  Yadira Symptoms: No problems reported or observed. Hallucination Type:  (denies)  Hallucinations: None  Delusions: No  Delusions:  Other (comment) (none)  Memory:Normal: No  Memory: Poor recent  Insight and Judgment: No  Insight and Judgment: Poor judgment,Poor insight      Metabolic Screening:    No results found for: LABA1C    No results found for: CHOL  No results found for: TRIG  No results found for: HDL  No components found for: LDLCAL  No results found for: Meg Littlejohn RN

## 2022-04-25 NOTE — BH NOTE
Group Therapy Note    Date: 4/24/2022  Start Time: 20:00  End Time:  21:00  Number of Participants: 12    Type of Group: Recreational  Wrap up    Wellness Binder Information  Module Name:  /  Session Number:  /    Patient's Goal:  Out of room more    Notes:  Continuing to work on goal    Status After Intervention:  Unchanged    Participation Level:  Active Listener and Interactive    Participation Quality: Appropriate and Attentive      Speech:  normal      Thought Process/Content: Logical      Affective Functioning: Blunted      Mood: anxious      Level of consciousness:  Alert and Attentive      Response to Learning: Able to change behavior      Endings: None Reported    Modes of Intervention: Socialization and Problem-solving      Discipline Responsible: Behavorial Health Tech      Signature:  Kurt Dong

## 2022-04-25 NOTE — BH NOTE
Commit lozenge 2 mg and Ativan 2mg given per PRN orders. Pt requested Ativan stating she feels anxious.

## 2022-04-25 NOTE — GROUP NOTE
Group Therapy Note    Date: 4/25/2022    Group Start Time: 1000  Group End Time: 5275  Group Topic: Cognitive Skills    [address] [facility]        Group Therapy Note    Attendees: 7    Group members were given different topics and asked to line up in order without communicating verbally. The goal of the group was to show the members the importance of communication. Notes:  Aniya Carlson remained in group on and off during the duration. Aniya Carlson remained engaged and interacted approrpiately with others in the group. Status After Intervention:  Improved    Participation Level:  Active Listener and Interactive    Participation Quality: Appropriate and Attentive      Speech:  normal      Thought Process/Content: Logical      Affective Functioning: Congruent      Mood: euthymic      Level of consciousness:  Alert      Response to Learning: Able to verbalize current knowledge/experience      Endings: None Reported    Modes of Intervention: Socialization, Exploration and Activity      Discipline Responsible: Behavorial Health Tech      Signature:  JASON Escobar

## 2022-04-25 NOTE — PLAN OF CARE
Problem: Pain:  Goal: Pain level will decrease  4/24/2022 2247 by Janet Crain RN  Outcome: Progressing  4/24/2022 1644 by Raven Srivastava RN  Outcome: Progressing  Goal: Control of acute pain  4/24/2022 2247 by Janet Crain RN  Outcome: Progressing  4/24/2022 1644 by Raven Srivastava RN  Outcome: Progressing  Goal: Control of chronic pain  4/24/2022 2247 by Janet Crain RN  Outcome: Progressing  4/24/2022 1644 by Raven Srivastava RN  Outcome: Progressing

## 2022-04-25 NOTE — FLOWSHEET NOTE
04/24/22 2248   Daily Nutrition   Level of Assistance Independent/Self   Mental Status and Behavioral Exam   Normal No   Facial Expression Flat   Affect Appropriate   Level of Consciousness Alert   Frequency of Checks 4 times per hour, close   Mood:Normal No   Mood Anxious   Motor Activity:Normal Yes   Eye Contact Good   Observed Behavior Cooperative;Friendly   Sexual Misconduct History Current - no   Preception [place] to person;[place] to time;[place] to place;[place] to situation   Attention:Normal No   Attention Distractible   Thought Processes Circumstantial   Thought Content:Normal No   Thought Content Paranoia   Depression Symptoms Impaired concentration   Anxiety Symptoms Generalized   Yadira Symptoms No problems reported or observed.    Hallucinations None   Delusions No   Memory:Normal No   Memory Poor recent   Insight and Judgment No   Insight and Judgment Poor judgment;Poor insight   Neurological   Level of Consciousness Alert (0)   Orientation Level Oriented X4   Respiratory   Respiratory (WDL) WDL   Cardiovascular/Peripheral Vascular   CV/PV (WDL) WDL   Musculoskeletal   Musculoskeletal (WDL) WDL   Genitourinary   Genitourinary (WDL) WDL   Gastrointestinal   Gastrointestinal (WDL) WDL   Neurological   Neuro (WDL) WDL

## 2022-05-22 PROBLEM — Z00.00 ENCOUNTER FOR ROUTINE ADULT MEDICAL EXAMINATION: Status: RESOLVED | Noted: 2022-04-20 | Resolved: 2022-05-22

## 2022-05-28 ENCOUNTER — HOSPITAL ENCOUNTER (EMERGENCY)
Age: 42
Discharge: HOME | End: 2022-05-28
Attending: EMERGENCY MEDICINE
Payer: MEDICAID

## 2022-05-28 VITALS
HEIGHT: 66 IN | WEIGHT: 183 LBS | DIASTOLIC BLOOD PRESSURE: 77 MMHG | SYSTOLIC BLOOD PRESSURE: 127 MMHG | TEMPERATURE: 98 F | RESPIRATION RATE: 20 BRPM | HEART RATE: 92 BPM | BODY MASS INDEX: 29.41 KG/M2 | OXYGEN SATURATION: 97 %

## 2022-05-28 DIAGNOSIS — R07.9 CHEST PAIN, UNSPECIFIED TYPE: ICD-10-CM

## 2022-05-28 DIAGNOSIS — F22 PARANOIA (HCC): Primary | ICD-10-CM

## 2022-05-28 PROCEDURE — 99283 EMERGENCY DEPT VISIT LOW MDM: CPT

## 2022-05-28 PROCEDURE — 93005 ELECTROCARDIOGRAM TRACING: CPT

## 2022-05-28 ASSESSMENT — PAIN - FUNCTIONAL ASSESSMENT: PAIN_FUNCTIONAL_ASSESSMENT: NONE - DENIES PAIN

## 2022-05-28 NOTE — ED PROVIDER NOTES
[facility]          EM RESIDENT NOTE       Date of evaluation: 5/28/2022    Chief Complaint     Pelvic Pain and Chest Pain      History of Present Illness     Mariam Stokes is a 39 y.o. female with a PMHx of drug abuse, psych disease who presents, further history reviewed in chart and noted below who presents to the emergency department today brought in by EMS for multiple complaints. Per EMS, the patient was found on the side of the road and reportedly called for EMS herself. On arrival, when asking the patient what brings her in she states that she has been having pelvic pain for the last 3 days and also reports that she is not been able to urinate. Now, she states that the pain is in her chest.  She describes the pain as chest pressure. It does not radiate. She is unable to tell me the severity of this pain. She denies any recent fevers, aches, or chills. She denies any IV drug abuse. She states that she has been having normal p.o. intake. She denies any vaginal bleeding or discharge or concern of STD/STI. Denies dysuria or hematuria. Of note, the patient was seen this morning at [facility] after smoking crack with recent discharge from behavioral health a couple of days ago. On chart review, she had reported concern that there might have been a drone in her vagina. Patient does have previous presentations of paranoia secondary to substance abuse. She denies any SI or HI at this time. No auditory or visual hallucinations. U tox at this time was positive for cocaine, methamphetamine, amphetamine, and cannabinoid. He was able to provide a urine sample despite her complaints of inability to urinate. Other than that mentioned above, there are no other alleviating or provoking factors associated with the patient's presentation today.     Review of Systems     Review of Systems    Review of systems is positive for difficulty with urination, pelvic pain  Review of systems is negative for shortness of breath, abdominal pain, nausea, vomiting, fevers aches or chills  Further review of systems is negative other than that mentioned in the HPI. Past Medical, Surgical, Family, and Social History     She has a past medical history of Anxiety, Cancer ([address] Depression, Heart murmur, and Hypertension. She has a past surgical history that includes Breast surgery and partial hysterectomy (cervix not removed). Her family history includes Cancer in her father. She reports that she has been smoking cigarettes. She has been smoking about 1.00 pack per day. She has never used smokeless tobacco. She reports current drug use. Drug: Marijuana Margmarion Boucher). She reports that she does not drink alcohol. Medications     Previous Medications    ALBUTEROL SULFATE HFA (VENTOLIN HFA) 108 (90 BASE) MCG/ACT INHALER    Inhale 2 puffs into the lungs every 4 hours as needed for Wheezing or Shortness of Breath    NICOTINE (NICODERM CQ) 7 MG/24HR    Place 1 patch onto the skin daily for 15 days    PALIPERIDONE (INVEGA) 6 MG EXTENDED RELEASE TABLET    Take 1 tablet by mouth daily       Allergies     She is allergic to codeine, tramadol, vicodin [hydrocodone-acetaminophen], and geodon [ziprasidone hydrochloride]. Physical Exam     INITIAL VITALS: BP: 127/77, Temp: 98 °F (36.7 °C), Heart Rate: 92, Resp: 20, SpO2: 97 %     Gen: NAD, in bed comfortably, non-diaphoretic   Head/Eyes: Atraumatic. PERRL. EOMI bilaterally. No conjunctival injection or scleral icterus   ENT: Neck supple, trachea midline, no LAD. MMM, no posterior oropharyngeal erythema or exudate. External auditory meatus clear without discharge or erythema. No nasal congestion or discharge. Cardiovascular: RRR no murmurs, rubs, or gallops. Pulmonary:Lungs CTAB, no rales, wheezes, or ronchi   Abdominal: Soft, non-tender. No rebound or guarding.  Non-peritoneal   : Patient refuses pelvic or rectal examination at this time. Risks were discussed. MSK: no obvious long bone deformity. Skin:  Warm, dry. No rashes, ecchymosis or cyanosis. Neuro: Alert and oriented x 4. Cranial nerves II-XII intact. Sensation intact to light touch in bilateral upper and lower extremities. She has a narrow steady gait. FTN intact bilaterally. Normal Romberg. RUE: 5/5 strength at the shoulder, elbow, wrist, and  with activation of all major muscle groups        LUE: 5/5 strength at the shoulder, elbow, wrist, and  with activation of all major muscle groups        RLE: 5/5 strength at the hip, knee, and ankle with activation of all major muscle groups       LLE: 5/5 strength at the hip, knee, and ankle with activation of all major muscle groups   Extremities:  No peripheral edema. Psych: Euthymic affect. Normal rate and rhythm of speech with full prosody. Linear thought process. DiagnosticResults     EKG   Interpreted in conjunction with emergencydepartment physician No att. providers found    EKG obtained here with a normal sinus rhythm with a ventricular rate of 91 bpm.  WA interval is within normal limits. QRS is narrow. No QT or QTc prolongation. Normal axis. There is no ST elevation or depression. No significant T wave abnormalities. No STEMI. RADIOLOGY:  No orders to display       LABS:   No results found for this visit on 05/28/22. ED BEDSIDE ULTRASOUND:  None    RECENT VITALS:  BP: 127/77, Temp: 98 °F (36.7 °C), Heart Rate: 92,Resp: 20, SpO2: 97 %     Procedures     None    ED Course     Nursing Notes, Past Medical Hx, Past Surgical Hx, Social Hx, Allergies, and Family Hx were reviewed. The patient was given the followingmedications:  No orders of the defined types were placed in this encounter.       CONSULTS:  None    ED Course as of 05/28/22 2012   Sat May 28, 2022   7556 Patient was noted to have urinated earlier this morning at which time she had UDS that was positive for multiple Further, I did recommend rectal examination in the setting of decreased urine output/urinary retention and discussed with her that I am concerned that she may have some sort of neurologic or spinal pathology. However, the patient has further refused this at this time. Patient has a nonfocal neurologic examination and is ambulating with a narrow and steady gait. She is afebrile and denies any IV drug abuse. As a result, I have low suspicion for primary spinal cord pathology including spinal epidural abscess, osteomyelitis, discitis or any other infection of the spine at this time. Basic labs were attempted to be obtained, however the patient adamantly refuses blood draw at this time. Patient does appear to have capacity at this time and thus labs will not be obtained. Patient had initially complained of chest pain though this resolved spontaneously. Chest pain is low risk given her history/symptoms. EKG obtained here in the emergency department without evidence of interval prolongation or STEMI. No significant abnormalities. As result, I have low suspicion for ACS and do not believe patient requires further work-up, as the patient is refusing laboratory draws at this time. However, I have recommended that she follow-up with her primary care physician for further evaluation on outpatient basis. Given her history, physical examination, and recent evaluation at outside hospital for similar presentation, I do not believe patient requires further laboratory evaluation or imaging at this time. Further, patient does appear to have capacity and does not appear to be a harm to self or anybody else and thus does not require a psychiatric hold imminently. Given the above in addition to refusal of further medical care, I believe the patient is stable for discharge. I have discharged the patient with robust return precautions, ambulating without assistance and tolerating p.o. Questions have been answered.     This patient was also evaluated by the attending physician. All care plans werediscussed and agreed upon. Clinical Impression     1. Paranoia ([address]        Disposition     PATIENT REFERRED TO:  Referring Not In System    Schedule an appointment as soon as possible for a visit in 1 week      [facility] Emergency Department  [address] [place] [address]  Go to   As needed, If symptoms worsen      DISCHARGE MEDICATIONS:  New Prescriptions    No medications on file       DISPOSITION     At this time, the patient was deemed appropriate for discharge. All laboratory and imaging findings were discussed with the patient, and the patient was given the opportunity to ask questions. All questions were answered to their satisfaction. At this time, the patient was ready to be discharged. I recommended that the patient follow up with their primary care physician in the next week to ensure symptom improvement and continued evaluation and management.        Thomas Keys MD  05/28/22 2013

## 2022-05-28 NOTE — ED TRIAGE NOTES
Patient was found lying on the sidewalk across the street from the [address] [place] [address] . When approached by them she demonstrated flat affect, said that she took more adderall than usual this am. C/o \"uterine\" and chest pain.   EMS states that patient was verbalizing paranoid ideation enroute

## 2022-05-29 LAB
EKG ATRIAL RATE: 91 BPM
EKG DIAGNOSIS: NORMAL
EKG P AXIS: 75 DEGREES
EKG P-R INTERVAL: 128 MS
EKG Q-T INTERVAL: 380 MS
EKG QRS DURATION: 82 MS
EKG QTC CALCULATION (BAZETT): 467 MS
EKG R AXIS: 47 DEGREES
EKG T AXIS: 36 DEGREES
EKG VENTRICULAR RATE: 91 BPM

## 2022-05-29 NOTE — ED PROVIDER NOTES
ED Attending Attestation Note     Date of evaluation: 5/28/2022    This patient was seen by the resident. I have seen and examined the patient, agree with the workup, evaluation, management and diagnosis. The care plan has been discussed. I have reviewed the ECG and concur with the resident's interpretation. My assessment reveals patient with significant psychiatric history presents complaining of chest pain. Patient gives a poor description of pain, describes it as feeling \"like death\" and lasting for 3 days, but then also stating that the pain went away in the middle of my interview. Patient has clear breath sounds normal heart sounds. She does have evidence of paranoid thoughts but her psychiatric disease does not appear to be decompensated to the point of not being able to care for herself. Will check EKG, laboratory studies.       Smita Moody MD  05/28/22 2038
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
